# Patient Record
Sex: MALE | Race: WHITE | Employment: FULL TIME | URBAN - METROPOLITAN AREA
[De-identification: names, ages, dates, MRNs, and addresses within clinical notes are randomized per-mention and may not be internally consistent; named-entity substitution may affect disease eponyms.]

---

## 2018-02-12 PROBLEM — I25.810 CORONARY ARTERY DISEASE INVOLVING CORONARY BYPASS GRAFT OF NATIVE HEART WITHOUT ANGINA PECTORIS: Status: ACTIVE | Noted: 2018-02-12

## 2018-02-12 PROBLEM — L40.9 PSORIASIS: Status: ACTIVE | Noted: 2018-02-12

## 2018-02-12 PROBLEM — K21.9 GASTROESOPHAGEAL REFLUX DISEASE WITHOUT ESOPHAGITIS: Status: ACTIVE | Noted: 2018-02-12

## 2018-02-12 PROBLEM — E78.00 PURE HYPERCHOLESTEROLEMIA: Status: ACTIVE | Noted: 2018-02-12

## 2018-03-22 ENCOUNTER — TELEPHONE (OUTPATIENT)
Dept: PRIMARY CARE CLINIC | Age: 58
End: 2018-03-22

## 2018-03-22 DIAGNOSIS — M25.511 RIGHT SHOULDER PAIN, UNSPECIFIED CHRONICITY: Primary | ICD-10-CM

## 2018-03-22 NOTE — TELEPHONE ENCOUNTER
I think patient would benefit from physical therapy as well, maybe even more so than ortho referral. Can he do this? And, Is there a specific ortho they want.

## 2018-06-19 DIAGNOSIS — I10 HYPERTENSION, UNSPECIFIED TYPE: ICD-10-CM

## 2018-06-19 RX ORDER — LOSARTAN POTASSIUM 25 MG/1
25 TABLET ORAL DAILY
Qty: 30 TABLET | Refills: 2 | Status: SHIPPED | OUTPATIENT
Start: 2018-06-19 | End: 2018-10-15 | Stop reason: SDUPTHER

## 2018-10-15 ENCOUNTER — OFFICE VISIT (OUTPATIENT)
Dept: PRIMARY CARE CLINIC | Age: 58
End: 2018-10-15
Payer: COMMERCIAL

## 2018-10-15 VITALS
DIASTOLIC BLOOD PRESSURE: 88 MMHG | HEART RATE: 81 BPM | TEMPERATURE: 98.2 F | WEIGHT: 269 LBS | SYSTOLIC BLOOD PRESSURE: 130 MMHG | OXYGEN SATURATION: 98 % | BODY MASS INDEX: 37.52 KG/M2

## 2018-10-15 DIAGNOSIS — E78.00 PURE HYPERCHOLESTEROLEMIA: ICD-10-CM

## 2018-10-15 DIAGNOSIS — M76.32 IT BAND SYNDROME, LEFT: Primary | ICD-10-CM

## 2018-10-15 DIAGNOSIS — G89.29 CHRONIC PAIN OF RIGHT KNEE: ICD-10-CM

## 2018-10-15 DIAGNOSIS — I10 HYPERTENSION, UNSPECIFIED TYPE: ICD-10-CM

## 2018-10-15 DIAGNOSIS — M25.561 CHRONIC PAIN OF RIGHT KNEE: ICD-10-CM

## 2018-10-15 PROCEDURE — 99213 OFFICE O/P EST LOW 20 MIN: CPT | Performed by: PHYSICIAN ASSISTANT

## 2018-10-15 RX ORDER — LOSARTAN POTASSIUM 25 MG/1
25 TABLET ORAL DAILY
Qty: 30 TABLET | Refills: 5 | Status: SHIPPED | OUTPATIENT
Start: 2018-10-15 | End: 2018-10-15 | Stop reason: SDUPTHER

## 2018-10-15 RX ORDER — ATORVASTATIN CALCIUM 40 MG/1
40 TABLET, FILM COATED ORAL DAILY
Qty: 90 TABLET | Refills: 1 | Status: SHIPPED | OUTPATIENT
Start: 2018-10-15 | End: 2019-01-09 | Stop reason: ALTCHOICE

## 2018-10-15 RX ORDER — LOSARTAN POTASSIUM 25 MG/1
25 TABLET ORAL DAILY
Qty: 90 TABLET | Refills: 1 | Status: SHIPPED | OUTPATIENT
Start: 2018-10-15 | End: 2019-12-13 | Stop reason: SDUPTHER

## 2018-10-15 RX ORDER — ATORVASTATIN CALCIUM 40 MG/1
40 TABLET, FILM COATED ORAL DAILY
Qty: 30 TABLET | Refills: 5 | Status: SHIPPED | OUTPATIENT
Start: 2018-10-15 | End: 2018-10-15 | Stop reason: SDUPTHER

## 2018-10-15 NOTE — PROGRESS NOTES
Chief Complaint:   Knee Pain (right knee locking up and left thigh hurting and cramping)      History of Present Illness   Source of history provided by:  patient. History/Exam Limitations: none. Aliza Pereira is a 59 y.o. old male who has a past medical history of:   Past Medical History:   Diagnosis Date    CAD (coronary artery disease)     age 43     GERD (gastroesophageal reflux disease)     Hyperlipidemia     presents today with complaints of R knee pain, notes locking and catching on him on a mainly daily basis now and at times multiple times a day, the last 6-8 months. He has no known trauma or injury, but is very active with his job, up on [employer], and physical, he notes this has been very difficult. The pain has not improved since the onset of symptoms. Per pt, there is mild swelling and moderate pain with ROM. Pt denies any N/T, calf pain/edema, foot/ankle pain, hip pain, back pain, abrasions, fever, chills, or any other symptoms. Pt also notes L hip, lateral aspect has been bothersome the last 2-3 weeks, had total hip replacement with Dr. Carie Root a  Few years back, has had no issues with his hip until 3 weeks ago. notse cramplike like pain in lateral hip area, down IT band. Notes rest helps this, and massage. ROS    Unless otherwise stated in this report or unable to obtain because of the patient's clinical or mental status as evidenced by the medical record, this patients's positive and negative responses for Review of Systems, constitutional, psych, eyes, ENT, cardiovascular, respiratory, gastrointestinal, neurological, genitourinary, musculoskeletal, integument systems and systems related to the presenting problem are either stated in the preceding or were not pertinent or were negative for the symptoms and/or complaints related to the medical problem.don.     Past Medical History:   Past Surgical History:   Procedure Laterality Date    CORONARY ARTERY BYPASS GRAFT      quadruple bypass age 43    TOTAL HIP ARTHROPLASTY Left     mid 46s     Social History:  reports that he quit smoking about 4 years ago. He has a 30.00 pack-year smoking history. He has never used smokeless tobacco. He reports that he drinks alcohol. He reports that he does not use drugs. Family History: family history includes Alcohol Abuse in his father; Cancer in his father; No Known Problems in his mother. Allergies: Patient has no known allergies. Physical Exam         VS:  /88   Pulse 81   Temp 98.2 °F (36.8 °C)   Wt 269 lb (122 kg)   SpO2 98%   BMI 37.52 kg/m²     Oxygen Saturation Interpretation: Normal.    Constitutional:  Alert, development consistent with age. Neck:  Normal ROM. Supple. HEENT: Head NC/NT. Normal external ears. Eyes PERRL. Throat without erythema or exudate. Chest: Heart RRR without pathologic murmurs or gallops. Lungs CTA A and P without W/R/R.  R Knee: Inspection: R knee without obvious deformity. Tenderness:  minimal              Swelling/Effusion: none              Deformity: No obvious deformity. ROM: ROM 0º-120º with mild to moderate discomfort. Skin:  No bruising noted. No abrasions, rashes, or erythema noted. Drawer's:  neg anterior/posterior drawer sign. Rob's: Neg Rob's sign. Valgus/Varus Stress: neg Varus/Valgus stress sign. Crepitus: moderate-severe crepitus noted with flexion and extension. R  Hip:            Tenderness:  No TTP.              ROM: FROM hip without pain of R hip. L hip: +TTP along the mid area of IT band, tightness noted, full ROM of the L hip noted, no limitations, pain in IT band with adduction of leg. Joint(s) Below: thigh. Tenderness:  No TTP over calf, ankle, or foot without any edema. ROM: FROM without pain or deficits. Neurovascular:             Sensory deficit: Sensation intact above and below the injury site.

## 2018-11-16 ENCOUNTER — TELEPHONE (OUTPATIENT)
Dept: PRIMARY CARE CLINIC | Age: 58
End: 2018-11-16

## 2018-11-19 ENCOUNTER — OFFICE VISIT (OUTPATIENT)
Dept: PRIMARY CARE CLINIC | Age: 58
End: 2018-11-19
Payer: COMMERCIAL

## 2018-11-19 VITALS
DIASTOLIC BLOOD PRESSURE: 64 MMHG | SYSTOLIC BLOOD PRESSURE: 130 MMHG | OXYGEN SATURATION: 95 % | HEART RATE: 82 BPM | TEMPERATURE: 97.4 F

## 2018-11-19 DIAGNOSIS — G89.29 CHRONIC PAIN OF RIGHT KNEE: Primary | ICD-10-CM

## 2018-11-19 DIAGNOSIS — M25.561 CHRONIC PAIN OF RIGHT KNEE: Primary | ICD-10-CM

## 2018-11-19 PROCEDURE — 99213 OFFICE O/P EST LOW 20 MIN: CPT | Performed by: PHYSICIAN ASSISTANT

## 2018-11-19 NOTE — LETTER
[facility]  [address] [id number]  Phone: [phone]  Fax: [phone]         November 19, 2018     Patient: Zaida Monroe   YOB: 1960   Date of Visit: 11/19/2018       To Whom It May Concern:    Zaida Monroe was seen in my office. Due to his orthopedic medical condition, he is expected to be off his current job, on disability, for a minimum of one year (up to October 15, 2019). He is seeing orthopedic surgeon on November 29, 2018, for potential further treatment, and this date may change.        Sincerely,        Alejandra Hoyt PA-C        Signature:__________________________________

## 2019-01-07 ENCOUNTER — OFFICE VISIT (OUTPATIENT)
Dept: PRIMARY CARE CLINIC | Age: 59
End: 2019-01-07
Payer: COMMERCIAL

## 2019-01-07 ENCOUNTER — HOSPITAL ENCOUNTER (OUTPATIENT)
Age: 59
Discharge: HOME | End: 2019-01-09
Payer: COMMERCIAL

## 2019-01-07 VITALS
BODY MASS INDEX: 37.52 KG/M2 | SYSTOLIC BLOOD PRESSURE: 134 MMHG | HEART RATE: 67 BPM | OXYGEN SATURATION: 96 % | DIASTOLIC BLOOD PRESSURE: 84 MMHG | TEMPERATURE: 98.2 F | WEIGHT: 269 LBS

## 2019-01-07 DIAGNOSIS — Z01.818 PRE-OP EXAMINATION: ICD-10-CM

## 2019-01-07 DIAGNOSIS — I10 ESSENTIAL HYPERTENSION: ICD-10-CM

## 2019-01-07 DIAGNOSIS — E78.00 PURE HYPERCHOLESTEROLEMIA: Primary | ICD-10-CM

## 2019-01-07 DIAGNOSIS — E78.00 PURE HYPERCHOLESTEROLEMIA: ICD-10-CM

## 2019-01-07 DIAGNOSIS — L40.9 PSORIASIS: ICD-10-CM

## 2019-01-07 LAB
ALBUMIN SERPL-MCNC: 4.5 G/DL (ref 3.5–5.2)
ALP BLD-CCNC: 55 U/L (ref 40–129)
ALT SERPL-CCNC: 30 U/L (ref 0–40)
ANION GAP SERPL CALCULATED.3IONS-SCNC: 15 MMOL/L (ref 7–16)
AST SERPL-CCNC: 23 U/L (ref 0–39)
BASOPHILS ABSOLUTE: 0.06 E9/L (ref 0–0.2)
BASOPHILS RELATIVE PERCENT: 0.8 % (ref 0–2)
BILIRUB SERPL-MCNC: 0.5 MG/DL (ref 0–1.2)
BILIRUBIN, POC: NORMAL
BLOOD URINE, POC: NORMAL
BUN BLDV-MCNC: 12 MG/DL (ref 8–23)
CALCIUM SERPL-MCNC: 9.2 MG/DL (ref 8.6–10.2)
CHLORIDE BLD-SCNC: 101 MMOL/L (ref 98–107)
CHOLESTEROL, TOTAL: 207 MG/DL (ref 0–199)
CLARITY, POC: NORMAL
CO2: 25 MMOL/L (ref 22–29)
COLOR, POC: YELLOW
CREAT SERPL-MCNC: 0.8 MG/DL (ref 0.7–1.2)
EOSINOPHILS ABSOLUTE: 0.36 E9/L (ref 0.05–0.5)
EOSINOPHILS RELATIVE PERCENT: 5 % (ref 0–6)
GFR AFRICAN AMERICAN: >60
GFR NON-AFRICAN AMERICAN: >60 ML/MIN/1.73
GLUCOSE BLD-MCNC: 93 MG/DL (ref 74–99)
GLUCOSE URINE, POC: NORMAL
HCT VFR BLD CALC: 45.6 % (ref 37–54)
HDLC SERPL-MCNC: 46 MG/DL
HEMOGLOBIN: 14.7 G/DL (ref 12.5–16.5)
IMMATURE GRANULOCYTES #: 0.02 E9/L
IMMATURE GRANULOCYTES %: 0.3 % (ref 0–5)
KETONES, POC: NORMAL
LDL CHOLESTEROL CALCULATED: 136 MG/DL (ref 0–99)
LEUKOCYTE EST, POC: NORMAL
LYMPHOCYTES ABSOLUTE: 1.86 E9/L (ref 1.5–4)
LYMPHOCYTES RELATIVE PERCENT: 25.9 % (ref 20–42)
MCH RBC QN AUTO: 31.3 PG (ref 26–35)
MCHC RBC AUTO-ENTMCNC: 32.2 % (ref 32–34.5)
MCV RBC AUTO: 97 FL (ref 80–99.9)
MONOCYTES ABSOLUTE: 0.53 E9/L (ref 0.1–0.95)
MONOCYTES RELATIVE PERCENT: 7.4 % (ref 2–12)
NEUTROPHILS ABSOLUTE: 4.34 E9/L (ref 1.8–7.3)
NEUTROPHILS RELATIVE PERCENT: 60.6 % (ref 43–80)
NITRITE, POC: NORMAL
PDW BLD-RTO: 13.2 FL (ref 11.5–15)
PH, POC: 6.5
PLATELET # BLD: 319 E9/L (ref 130–450)
PMV BLD AUTO: 10 FL (ref 7–12)
POTASSIUM SERPL-SCNC: 4.3 MMOL/L (ref 3.5–5)
PROTEIN, POC: NORMAL
RBC # BLD: 4.7 E12/L (ref 3.8–5.8)
SODIUM BLD-SCNC: 141 MMOL/L (ref 132–146)
SPECIFIC GRAVITY, POC: 1.02
TOTAL PROTEIN: 7.4 G/DL (ref 6.4–8.3)
TRIGL SERPL-MCNC: 124 MG/DL (ref 0–149)
UROBILINOGEN, POC: NORMAL
VLDLC SERPL CALC-MCNC: 25 MG/DL
WBC # BLD: 7.2 E9/L (ref 4.5–11.5)

## 2019-01-07 PROCEDURE — 99214 OFFICE O/P EST MOD 30 MIN: CPT | Performed by: PHYSICIAN ASSISTANT

## 2019-01-07 PROCEDURE — 85025 COMPLETE CBC W/AUTO DIFF WBC: CPT

## 2019-01-07 PROCEDURE — 81002 URINALYSIS NONAUTO W/O SCOPE: CPT | Performed by: PHYSICIAN ASSISTANT

## 2019-01-07 PROCEDURE — 80053 COMPREHEN METABOLIC PANEL: CPT

## 2019-01-07 PROCEDURE — 80061 LIPID PANEL: CPT

## 2019-01-07 RX ORDER — BETAMETHASONE DIPROPIONATE 0.5 MG/G
CREAM TOPICAL 2 TIMES DAILY
Qty: 45 G | Refills: 5 | Status: SHIPPED
Start: 2019-01-07 | End: 2021-03-30 | Stop reason: SDUPTHER

## 2019-01-09 ENCOUNTER — TELEPHONE (OUTPATIENT)
Dept: PRIMARY CARE CLINIC | Age: 59
End: 2019-01-09

## 2019-01-09 RX ORDER — ROSUVASTATIN CALCIUM 10 MG/1
10 TABLET, COATED ORAL DAILY
Qty: 90 TABLET | Refills: 0 | Status: SHIPPED | OUTPATIENT
Start: 2019-01-09 | End: 2019-04-26 | Stop reason: SDUPTHER

## 2019-04-26 ENCOUNTER — OFFICE VISIT (OUTPATIENT)
Dept: PRIMARY CARE CLINIC | Age: 59
End: 2019-04-26
Payer: COMMERCIAL

## 2019-04-26 VITALS
DIASTOLIC BLOOD PRESSURE: 82 MMHG | OXYGEN SATURATION: 95 % | SYSTOLIC BLOOD PRESSURE: 130 MMHG | HEART RATE: 87 BPM | TEMPERATURE: 97.1 F | RESPIRATION RATE: 16 BRPM

## 2019-04-26 DIAGNOSIS — K21.9 GASTROESOPHAGEAL REFLUX DISEASE WITHOUT ESOPHAGITIS: ICD-10-CM

## 2019-04-26 DIAGNOSIS — I10 ESSENTIAL HYPERTENSION: ICD-10-CM

## 2019-04-26 DIAGNOSIS — I10 HYPERTENSION, UNSPECIFIED TYPE: ICD-10-CM

## 2019-04-26 DIAGNOSIS — E78.00 PURE HYPERCHOLESTEROLEMIA: Primary | ICD-10-CM

## 2019-04-26 DIAGNOSIS — S39.012S LUMBAR STRAIN, SEQUELA: ICD-10-CM

## 2019-04-26 DIAGNOSIS — I25.810 CORONARY ARTERY DISEASE INVOLVING CORONARY BYPASS GRAFT OF NATIVE HEART WITHOUT ANGINA PECTORIS: ICD-10-CM

## 2019-04-26 PROCEDURE — 99214 OFFICE O/P EST MOD 30 MIN: CPT | Performed by: PHYSICIAN ASSISTANT

## 2019-04-26 RX ORDER — CYCLOBENZAPRINE HCL 10 MG
10 TABLET ORAL 3 TIMES DAILY PRN
Qty: 90 TABLET | Refills: 1 | Status: SHIPPED
Start: 2019-04-26 | End: 2020-10-29 | Stop reason: SDUPTHER

## 2019-04-26 RX ORDER — OMEPRAZOLE 20 MG/1
20 CAPSULE, DELAYED RELEASE ORAL DAILY
Qty: 90 CAPSULE | Refills: 1 | Status: SHIPPED | OUTPATIENT
Start: 2019-04-26

## 2019-04-26 RX ORDER — ROSUVASTATIN CALCIUM 10 MG/1
10 TABLET, COATED ORAL DAILY
Qty: 90 TABLET | Refills: 1 | Status: SHIPPED | OUTPATIENT
Start: 2019-04-26 | End: 2019-12-13 | Stop reason: SDUPTHER

## 2019-04-26 ASSESSMENT — PATIENT HEALTH QUESTIONNAIRE - PHQ9
SUM OF ALL RESPONSES TO PHQ9 QUESTIONS 1 & 2: 0
1. LITTLE INTEREST OR PLEASURE IN DOING THINGS: 0
SUM OF ALL RESPONSES TO PHQ QUESTIONS 1-9: 0
2. FEELING DOWN, DEPRESSED OR HOPELESS: 0
SUM OF ALL RESPONSES TO PHQ QUESTIONS 1-9: 0

## 2019-04-26 NOTE — PROGRESS NOTES
Abhijeet Foss  1960 4/26/19      HPI:    Patient presents for FU since his total R knee replacement. He notes it has helped with him pain, he however had a click and pop in his knee, and is seeing his ortho, Dr. Keegan Sam next week. His knee has otherwise been doing OK. He does admit he strained his lower back last week, carried a crate (work at home) and felt a pull in his back, and felt a slight twinge of pain, next day it was tight and spasms, no pain down legs, no tinlging down legs, no weakness. Does admit he was given muscle relaxer last year, it did help some, but asks if something can help him rest.   Since last visit as well, we switched pt statin, as lipitor wasn't making his lipids optimal, he doesn't watch his diet. He will return for fasting labs. His HTn is controlled on low dose losartan. Past Medical History:   Diagnosis Date    CAD (coronary artery disease)     age 43     GERD (gastroesophageal reflux disease)     Hyperlipidemia         Past Surgical History:   Procedure Laterality Date    CORONARY ARTERY BYPASS GRAFT      quadruple bypass age 43    TOTAL HIP ARTHROPLASTY Left     mid 46s       Current Outpatient Medications   Medication Sig Dispense Refill    rosuvastatin (CRESTOR) 10 MG tablet Take 1 tablet by mouth daily 90 tablet 1    omeprazole (PRILOSEC) 20 MG delayed release capsule Take 1 capsule by mouth daily 90 capsule 1    cyclobenzaprine (FLEXERIL) 10 MG tablet Take 1 tablet by mouth 3 times daily as needed for Muscle spasms 90 tablet 1    betamethasone dipropionate (DIPROLENE) 0.05 % cream Apply topically 2 times daily Apply topically 2 times daily. 45 g 5    losartan (COZAAR) 25 MG tablet Take 1 tablet by mouth daily 90 tablet 1     No current facility-administered medications for this visit.         No Known Allergies    Family History   Problem Relation Age of Onset    No Known Problems Mother     Cancer Father         unknown cancer, liver, alcoholic    Alcohol Abuse Father        Social History     Socioeconomic History    Marital status:      Spouse name: Not on file    Number of children: Not on file    Years of education: Not on file    Highest education level: Not on file   Occupational History    Not on file   Social Needs    Financial resource strain: Not on file    Food insecurity:     Worry: Not on file     Inability: Not on file    Transportation needs:     Medical: Not on file     Non-medical: Not on file   Tobacco Use    Smoking status: Former Smoker     Packs/day: 1.00     Years: 30.00     Pack years: 30.00     Last attempt to quit: 10/15/2014     Years since quittin.5    Smokeless tobacco: Never Used   Substance and Sexual Activity    Alcohol use: Yes     Comment: occasional, on special occasions     Drug use: No    Sexual activity: Yes     Partners: Female     Comment: wife   Lifestyle    Physical activity:     Days per week: Not on file     Minutes per session: Not on file    Stress: Not on file   Relationships    Social connections:     Talks on phone: Not on file     Gets together: Not on file     Attends Tenriism service: Not on file     Active member of club or organization: Not on file     Attends meetings of clubs or organizations: Not on file     Relationship status: Not on file    Intimate partner violence:     Fear of current or ex partner: Not on file     Emotionally abused: Not on file     Physically abused: Not on file     Forced sexual activity: Not on file   Other Topics Concern    Not on file   Social History Narrative    Works in [employer], [employer] plasterer. . Feels safe at home. Review of Systems :    Constitutional: Negative for fatigue, malaise, fever, chills, appetite change and unexpected weight change. HENT: Negative for congestion, ear discharge, ear pain, facial swelling, mouth sores, postnasal drip, rhinorrhea, sinus pressure, sore throat, tinnitus and trouble swallowing. 95%     Constitutional:  A and O x 3, in NAD. Afebrile. Appears stated age. Eyes:  PERRLA, EOMI without nystagmus. Conjunctiva normal. Sclera anicteric. Neck:  Supple. Nontender, no lymphadenopathy noted, no thyromegaly. Lungs:  Clear to auscultation and breath sounds equal.  Heart:  Regular rate and rhythm, normal S1 and S2, no m/r/g. No murmurs with change in position or Valsalva. PMI non-displaced. UE and LE distal pulses intact. Abdomen:  Soft, NTND, NABS x 4, no masses or organomegaly, no rebound or guarding. MS: pain with lumbar flexion and twisting, otherwise normal, painless range of motion of the lumbar spine otherwise, +ttp and spasm in lower lumbar msucles, otherwise neg SLR. No neurovascular deficit. audible popping and clocking heard with knee ROM, no swelling, scar noted. 5/5 strength in UE's/LE's/ bilaterally. Skin:  No abrasions, ecchymoses, or lacerations unless noted elsewhere. Extremities  No cyanosis, clubbing, or edema noted. Neurological:   Oriented. Motor functions intact. Assessment/Plan:     Rahul Selby was seen today for 3 month follow-up and other. Diagnoses and all orders for this visit:    Pure hypercholesterolemia  -     rosuvastatin (CRESTOR) 10 MG tablet; Take 1 tablet by mouth daily  -     COMPREHENSIVE METABOLIC PANEL; Future  -     LIPID PANEL; Future    Gastroesophageal reflux disease without esophagitis  -     omeprazole (PRILOSEC) 20 MG delayed release capsule; Take 1 capsule by mouth daily    Essential hypertension    Coronary artery disease involving coronary bypass graft of native heart without angina pectoris  -     rosuvastatin (CRESTOR) 10 MG tablet; Take 1 tablet by mouth daily    Hypertension, unspecified type    Lumbar strain, sequela  -     cyclobenzaprine (FLEXERIL) 10 MG tablet; Take 1 tablet by mouth 3 times daily as needed for Muscle spasms    we will trial the flexeril 10mg, call if no improvements, FU is worsens.   FU with ortho as well as advised for the knee. He refuses colonoscopy, stool testing right now. We will check his lipids today, hopefully better, with CAD, we want his LDL more tightly controlled. Needs to watch diet as well. BP controlled. Return in about 6 months (around 10/26/2019). Counseled regarding above diagnosis, including possible risks and complications,  especially if left uncontrolled. Counseled regarding the possible side effects, risks, benefits and alternatives to treatment; patient and/or guardian verbalizes understanding, agrees, feels comfortable with and wishes to proceed with above treatment plan. Advised patient to call with any new medication issues, and read all Rx info from pharmacy to assure aware of all possible risks and side effects of medication before taking. Reviewed age and gender appropriate health screening exams and vaccinations. Advised patient regarding importance of keeping up with recommended health maintenance and to schedule as soon as possible if overdue, as this is important in assessing for undiagnosed pathology, especially cancer, as well as protecting against potentially harmful/life threatening disease. Patient and/or guardian verbalizes understanding and agrees with above counseling, assessment and plan. All questions answered.     Mitch Leon PA-C

## 2019-12-13 ENCOUNTER — HOSPITAL ENCOUNTER (OUTPATIENT)
Age: 59
Discharge: HOME | End: 2019-12-15
Payer: COMMERCIAL

## 2019-12-13 ENCOUNTER — OFFICE VISIT (OUTPATIENT)
Dept: PRIMARY CARE CLINIC | Age: 59
End: 2019-12-13
Payer: COMMERCIAL

## 2019-12-13 VITALS
TEMPERATURE: 98.2 F | WEIGHT: 269 LBS | SYSTOLIC BLOOD PRESSURE: 130 MMHG | HEIGHT: 71 IN | BODY MASS INDEX: 37.66 KG/M2 | RESPIRATION RATE: 16 BRPM | OXYGEN SATURATION: 95 % | HEART RATE: 81 BPM | DIASTOLIC BLOOD PRESSURE: 80 MMHG

## 2019-12-13 DIAGNOSIS — I10 HYPERTENSION, UNSPECIFIED TYPE: ICD-10-CM

## 2019-12-13 DIAGNOSIS — Z87.891 HISTORY OF SMOKING: ICD-10-CM

## 2019-12-13 DIAGNOSIS — Z12.5 SCREENING PSA (PROSTATE SPECIFIC ANTIGEN): ICD-10-CM

## 2019-12-13 DIAGNOSIS — I25.810 CORONARY ARTERY DISEASE INVOLVING CORONARY BYPASS GRAFT OF NATIVE HEART WITHOUT ANGINA PECTORIS: ICD-10-CM

## 2019-12-13 DIAGNOSIS — E78.00 PURE HYPERCHOLESTEROLEMIA: ICD-10-CM

## 2019-12-13 DIAGNOSIS — J30.2 SEASONAL ALLERGIES: ICD-10-CM

## 2019-12-13 DIAGNOSIS — R06.2 WHEEZING: ICD-10-CM

## 2019-12-13 DIAGNOSIS — R06.2 WHEEZING: Primary | ICD-10-CM

## 2019-12-13 LAB
ALBUMIN SERPL-MCNC: 4.4 G/DL (ref 3.5–5.2)
ALP BLD-CCNC: 58 U/L (ref 40–129)
ALT SERPL-CCNC: 21 U/L (ref 0–40)
ANION GAP SERPL CALCULATED.3IONS-SCNC: 14 MMOL/L (ref 7–16)
AST SERPL-CCNC: 21 U/L (ref 0–39)
BILIRUB SERPL-MCNC: 0.5 MG/DL (ref 0–1.2)
BUN BLDV-MCNC: 15 MG/DL (ref 8–23)
CALCIUM SERPL-MCNC: 9.4 MG/DL (ref 8.6–10.2)
CHLORIDE BLD-SCNC: 106 MMOL/L (ref 98–107)
CHOLESTEROL, TOTAL: 198 MG/DL (ref 0–199)
CO2: 21 MMOL/L (ref 22–29)
CREAT SERPL-MCNC: 0.8 MG/DL (ref 0.7–1.2)
GFR AFRICAN AMERICAN: >60
GFR NON-AFRICAN AMERICAN: >60 ML/MIN/1.73
GLUCOSE BLD-MCNC: 115 MG/DL (ref 74–99)
HCT VFR BLD CALC: 47.2 % (ref 37–54)
HDLC SERPL-MCNC: 39 MG/DL
HEMOGLOBIN: 14.8 G/DL (ref 12.5–16.5)
LDL CHOLESTEROL CALCULATED: 144 MG/DL (ref 0–99)
MCH RBC QN AUTO: 30.1 PG (ref 26–35)
MCHC RBC AUTO-ENTMCNC: 31.4 % (ref 32–34.5)
MCV RBC AUTO: 96.1 FL (ref 80–99.9)
PDW BLD-RTO: 13.2 FL (ref 11.5–15)
PLATELET # BLD: 319 E9/L (ref 130–450)
PMV BLD AUTO: 10.4 FL (ref 7–12)
POTASSIUM SERPL-SCNC: 4.8 MMOL/L (ref 3.5–5)
PROSTATE SPECIFIC ANTIGEN: 0.43 NG/ML (ref 0–4)
RBC # BLD: 4.91 E12/L (ref 3.8–5.8)
SODIUM BLD-SCNC: 141 MMOL/L (ref 132–146)
TOTAL PROTEIN: 7.6 G/DL (ref 6.4–8.3)
TRIGL SERPL-MCNC: 75 MG/DL (ref 0–149)
VLDLC SERPL CALC-MCNC: 15 MG/DL
WBC # BLD: 6 E9/L (ref 4.5–11.5)

## 2019-12-13 PROCEDURE — 99214 OFFICE O/P EST MOD 30 MIN: CPT | Performed by: PHYSICIAN ASSISTANT

## 2019-12-13 PROCEDURE — 80053 COMPREHEN METABOLIC PANEL: CPT

## 2019-12-13 PROCEDURE — G8417 CALC BMI ABV UP PARAM F/U: HCPCS | Performed by: PHYSICIAN ASSISTANT

## 2019-12-13 PROCEDURE — G8599 NO ASA/ANTIPLAT THER USE RNG: HCPCS | Performed by: PHYSICIAN ASSISTANT

## 2019-12-13 PROCEDURE — 85027 COMPLETE CBC AUTOMATED: CPT

## 2019-12-13 PROCEDURE — G8484 FLU IMMUNIZE NO ADMIN: HCPCS | Performed by: PHYSICIAN ASSISTANT

## 2019-12-13 PROCEDURE — 80061 LIPID PANEL: CPT

## 2019-12-13 PROCEDURE — 3017F COLORECTAL CA SCREEN DOC REV: CPT | Performed by: PHYSICIAN ASSISTANT

## 2019-12-13 PROCEDURE — G0103 PSA SCREENING: HCPCS

## 2019-12-13 PROCEDURE — 1036F TOBACCO NON-USER: CPT | Performed by: PHYSICIAN ASSISTANT

## 2019-12-13 PROCEDURE — G8427 DOCREV CUR MEDS BY ELIG CLIN: HCPCS | Performed by: PHYSICIAN ASSISTANT

## 2019-12-13 RX ORDER — ALBUTEROL SULFATE 90 UG/1
2 AEROSOL, METERED RESPIRATORY (INHALATION) 4 TIMES DAILY PRN
Qty: 1 INHALER | Refills: 2 | Status: SHIPPED | OUTPATIENT
Start: 2019-12-13

## 2019-12-13 RX ORDER — MONTELUKAST SODIUM 10 MG/1
10 TABLET ORAL DAILY
Qty: 30 TABLET | Refills: 2 | Status: SHIPPED
Start: 2019-12-13 | End: 2020-04-14 | Stop reason: SDUPTHER

## 2019-12-13 RX ORDER — ROSUVASTATIN CALCIUM 10 MG/1
10 TABLET, COATED ORAL DAILY
Qty: 90 TABLET | Refills: 1 | Status: SHIPPED | OUTPATIENT
Start: 2019-12-13 | End: 2020-06-01 | Stop reason: SDUPTHER

## 2019-12-13 RX ORDER — LOSARTAN POTASSIUM 25 MG/1
25 TABLET ORAL DAILY
Qty: 90 TABLET | Refills: 1 | Status: SHIPPED | OUTPATIENT
Start: 2019-12-13 | End: 2020-06-01 | Stop reason: SDUPTHER

## 2020-04-14 RX ORDER — MONTELUKAST SODIUM 10 MG/1
10 TABLET ORAL DAILY
Qty: 90 TABLET | Refills: 1 | Status: SHIPPED
Start: 2020-04-14 | End: 2020-10-13 | Stop reason: SDUPTHER

## 2020-06-01 RX ORDER — LOSARTAN POTASSIUM 25 MG/1
25 TABLET ORAL DAILY
Qty: 90 TABLET | Refills: 1 | Status: SHIPPED
Start: 2020-06-01 | End: 2020-06-02 | Stop reason: SDUPTHER

## 2020-06-01 RX ORDER — ROSUVASTATIN CALCIUM 10 MG/1
10 TABLET, COATED ORAL DAILY
Qty: 90 TABLET | Refills: 1 | Status: SHIPPED
Start: 2020-06-01 | End: 2020-06-02 | Stop reason: SDUPTHER

## 2020-06-02 RX ORDER — LOSARTAN POTASSIUM 25 MG/1
25 TABLET ORAL DAILY
Qty: 90 TABLET | Refills: 1 | OUTPATIENT
Start: 2020-06-02

## 2020-06-02 RX ORDER — ROSUVASTATIN CALCIUM 10 MG/1
10 TABLET, COATED ORAL DAILY
Qty: 90 TABLET | Refills: 1 | OUTPATIENT
Start: 2020-06-02

## 2020-06-02 RX ORDER — LOSARTAN POTASSIUM 25 MG/1
25 TABLET ORAL DAILY
Qty: 90 TABLET | Refills: 1 | Status: SHIPPED
Start: 2020-06-02 | End: 2021-01-08 | Stop reason: SDUPTHER

## 2020-06-02 RX ORDER — ROSUVASTATIN CALCIUM 10 MG/1
10 TABLET, COATED ORAL DAILY
Qty: 90 TABLET | Refills: 1 | Status: SHIPPED
Start: 2020-06-02 | End: 2021-01-08 | Stop reason: SDUPTHER

## 2020-10-13 ENCOUNTER — HOSPITAL ENCOUNTER (OUTPATIENT)
Age: 60
Discharge: HOME | End: 2020-10-15
Payer: COMMERCIAL

## 2020-10-13 ENCOUNTER — OFFICE VISIT (OUTPATIENT)
Dept: PRIMARY CARE CLINIC | Age: 60
End: 2020-10-13
Payer: COMMERCIAL

## 2020-10-13 VITALS
SYSTOLIC BLOOD PRESSURE: 130 MMHG | TEMPERATURE: 97.7 F | DIASTOLIC BLOOD PRESSURE: 84 MMHG | OXYGEN SATURATION: 95 % | HEART RATE: 85 BPM | RESPIRATION RATE: 18 BRPM

## 2020-10-13 PROBLEM — J45.909 EXTRINSIC ASTHMA WITHOUT COMPLICATION: Status: ACTIVE | Noted: 2020-10-13

## 2020-10-13 LAB
ALBUMIN SERPL-MCNC: 4.3 G/DL (ref 3.5–5.2)
ALP BLD-CCNC: 57 U/L (ref 40–129)
ALT SERPL-CCNC: 26 U/L (ref 0–40)
ANION GAP SERPL CALCULATED.3IONS-SCNC: 16 MMOL/L (ref 7–16)
AST SERPL-CCNC: 27 U/L (ref 0–39)
BILIRUB SERPL-MCNC: 0.5 MG/DL (ref 0–1.2)
BUN BLDV-MCNC: 15 MG/DL (ref 8–23)
CALCIUM SERPL-MCNC: 9.9 MG/DL (ref 8.6–10.2)
CHLORIDE BLD-SCNC: 104 MMOL/L (ref 98–107)
CHOLESTEROL, TOTAL: 193 MG/DL (ref 0–199)
CO2: 21 MMOL/L (ref 22–29)
CREAT SERPL-MCNC: 0.8 MG/DL (ref 0.7–1.2)
GFR AFRICAN AMERICAN: >60
GFR NON-AFRICAN AMERICAN: >60 ML/MIN/1.73
GLUCOSE BLD-MCNC: 122 MG/DL (ref 74–99)
HCT VFR BLD CALC: 49.6 % (ref 37–54)
HDLC SERPL-MCNC: 47 MG/DL
HEMOGLOBIN: 15.5 G/DL (ref 12.5–16.5)
LDL CHOLESTEROL CALCULATED: 116 MG/DL (ref 0–99)
MCH RBC QN AUTO: 30.7 PG (ref 26–35)
MCHC RBC AUTO-ENTMCNC: 31.3 % (ref 32–34.5)
MCV RBC AUTO: 98.2 FL (ref 80–99.9)
PDW BLD-RTO: 13.7 FL (ref 11.5–15)
PLATELET # BLD: 347 E9/L (ref 130–450)
PMV BLD AUTO: 10.2 FL (ref 7–12)
POTASSIUM SERPL-SCNC: 4.8 MMOL/L (ref 3.5–5)
RBC # BLD: 5.05 E12/L (ref 3.8–5.8)
SODIUM BLD-SCNC: 141 MMOL/L (ref 132–146)
TOTAL PROTEIN: 7.5 G/DL (ref 6.4–8.3)
TRIGL SERPL-MCNC: 148 MG/DL (ref 0–149)
VLDLC SERPL CALC-MCNC: 30 MG/DL
WBC # BLD: 6.8 E9/L (ref 4.5–11.5)

## 2020-10-13 PROCEDURE — 90686 IIV4 VACC NO PRSV 0.5 ML IM: CPT | Performed by: PHYSICIAN ASSISTANT

## 2020-10-13 PROCEDURE — 85027 COMPLETE CBC AUTOMATED: CPT

## 2020-10-13 PROCEDURE — 80053 COMPREHEN METABOLIC PANEL: CPT

## 2020-10-13 PROCEDURE — 3017F COLORECTAL CA SCREEN DOC REV: CPT | Performed by: PHYSICIAN ASSISTANT

## 2020-10-13 PROCEDURE — G8417 CALC BMI ABV UP PARAM F/U: HCPCS | Performed by: PHYSICIAN ASSISTANT

## 2020-10-13 PROCEDURE — G8482 FLU IMMUNIZE ORDER/ADMIN: HCPCS | Performed by: PHYSICIAN ASSISTANT

## 2020-10-13 PROCEDURE — 90471 IMMUNIZATION ADMIN: CPT | Performed by: PHYSICIAN ASSISTANT

## 2020-10-13 PROCEDURE — 80061 LIPID PANEL: CPT

## 2020-10-13 PROCEDURE — 1036F TOBACCO NON-USER: CPT | Performed by: PHYSICIAN ASSISTANT

## 2020-10-13 PROCEDURE — 93000 ELECTROCARDIOGRAM COMPLETE: CPT | Performed by: PHYSICIAN ASSISTANT

## 2020-10-13 PROCEDURE — 99214 OFFICE O/P EST MOD 30 MIN: CPT | Performed by: PHYSICIAN ASSISTANT

## 2020-10-13 PROCEDURE — G8427 DOCREV CUR MEDS BY ELIG CLIN: HCPCS | Performed by: PHYSICIAN ASSISTANT

## 2020-10-13 RX ORDER — MONTELUKAST SODIUM 10 MG/1
10 TABLET ORAL DAILY
Qty: 90 TABLET | Refills: 1 | Status: SHIPPED
Start: 2020-10-13 | End: 2021-01-08 | Stop reason: SDUPTHER

## 2020-10-13 ASSESSMENT — PATIENT HEALTH QUESTIONNAIRE - PHQ9
2. FEELING DOWN, DEPRESSED OR HOPELESS: 0
1. LITTLE INTEREST OR PLEASURE IN DOING THINGS: 0
SUM OF ALL RESPONSES TO PHQ QUESTIONS 1-9: 0
SUM OF ALL RESPONSES TO PHQ9 QUESTIONS 1 & 2: 0
SUM OF ALL RESPONSES TO PHQ QUESTIONS 1-9: 0

## 2020-10-13 NOTE — PROGRESS NOTES
Bernard Murilloer  1960  10/13/20      HPI:    Patient presents for his routine check. He is seeing Dr. Josie wSann next week for another opinion on his knee. Notes no pain just a lot of swelling if he does too much, states not bad today. HTN, is controlled on losartan 25mg, and lipids last checked 12/2019, stable on crestor. He states he is compliant with his meds, \"I forget one day then go right back on\". Denies any cardiopulm S/Sx, CAD has been stable. ECG done today, no changes from 2018. GERD, controlled, takes PPi on occasion, states not QD, and less than few times a week. No GI issues, refuses ANUSHKA, refuses colonoscopy or stool testing (cologuard, fit) he will think about  it. Notse his allergies controlled on singualir QD, andhasn't needed albuterol, no wheezes, states only wheezes if he were to smoke (doesn't anymore, but would on rare occasion). Past Medical History:   Diagnosis Date    CAD (coronary artery disease)     age 43     GERD (gastroesophageal reflux disease)     Hyperlipidemia         Past Surgical History:   Procedure Laterality Date    CORONARY ARTERY BYPASS GRAFT      quadruple bypass age 43    JOINT REPLACEMENT      TOTAL HIP ARTHROPLASTY Left     mid 46s       Current Outpatient Medications   Medication Sig Dispense Refill    montelukast (SINGULAIR) 10 MG tablet Take 1 tablet by mouth daily 90 tablet 1    losartan (COZAAR) 25 MG tablet Take 1 tablet by mouth daily 90 tablet 1    rosuvastatin (CRESTOR) 10 MG tablet Take 1 tablet by mouth daily 90 tablet 1    albuterol sulfate  (90 Base) MCG/ACT inhaler Inhale 2 puffs into the lungs 4 times daily as needed for Wheezing 1 Inhaler 2    omeprazole (PRILOSEC) 20 MG delayed release capsule Take 1 capsule by mouth daily 90 capsule 1    betamethasone dipropionate (DIPROLENE) 0.05 % cream Apply topically 2 times daily Apply topically 2 times daily.  45 g 5     No current facility-administered medications for this visit. No Known Allergies    Family History   Problem Relation Age of Onset    No Known Problems Mother     Cancer Father         unknown cancer, liver, alcoholic    Alcohol Abuse Father        Social History     Socioeconomic History    Marital status:      Spouse name: Not on file    Number of children: Not on file    Years of education: Not on file    Highest education level: Not on file   Occupational History    Not on file   Social Needs    Financial resource strain: Not on file    Food insecurity     Worry: Not on file     Inability: Not on file    Transportation needs     Medical: Not on file     Non-medical: Not on file   Tobacco Use    Smoking status: Former Smoker     Packs/day: 1.00     Years: 30.00     Pack years: 30.00     Last attempt to quit: 10/15/2014     Years since quittin.0    Smokeless tobacco: Never Used   Substance and Sexual Activity    Alcohol use: Yes     Comment: occasional, on special occasions     Drug use: No    Sexual activity: Yes     Partners: Female     Comment: wife   Lifestyle    Physical activity     Days per week: Not on file     Minutes per session: Not on file    Stress: Not on file   Relationships    Social connections     Talks on phone: Not on file     Gets together: Not on file     Attends Temple service: Not on file     Active member of club or organization: Not on file     Attends meetings of clubs or organizations: Not on file     Relationship status: Not on file    Intimate partner violence     Fear of current or ex partner: Not on file     Emotionally abused: Not on file     Physically abused: Not on file     Forced sexual activity: Not on file   Other Topics Concern    Not on file   Social History Narrative    Works in construction(now retired), union plasterer. . Review of Systems :     Constitutional: Negative for fatigue, malaise, fever, chills, appetite change and unexpected weight change.    HENT: Negative for congestion, ear discharge, ear pain, facial swelling, mouth sores, postnasal drip, rhinorrhea, sinus pressure, sore throat, tinnitus and trouble swallowing. Eyes: Negative for vision changes, double vision, pain  Respiratory: Negative for cough, chest tightness, shortness of breath, chest heaviness, pleuritic pain,  wheezing. Cardiovascular: Negative for diaphoresis, chest pain, palpitations, or edema   Gastrointestinal: Negative for nausea, vomiting, abdominal pain, diarrhea, constipation, blood in stool, melena, changes in bowel habits. Endocrine: Negative for polydipsia, polyphagia and polyuria. No heat or cold intolerance. Genitourinary: Negative for dysuria, urgency, frequency, hematuria, difficulty urinating, nocturia. Musculoskeletal: Negative for myalgias, back pain, and arthralgias. No gait disturbance. Skin: Negative for rash, lesions, hair or nail changes. Allergic/Immunologic: Negative for environmental allergies and food allergies. Neurological: Negative for dizziness, weakness, tremors, syncope, speech difficulty, light-headedness, bowel or bladder control changes, numbness and headaches. Hematological: Negative for adenopathy. Does not bruise/bleed easily. Psychiatric/Behavioral: Negative for suicidal ideas, behavioral problems, sleep disturbance and decreased concentration. The patient is not nervous/anxious. Unless otherwise stated in this report or unable to obtain because of the patient's clinical or mental status as evidenced by the medical record, this patients's positive and negative responses for Review of Systems, constitutional, psych, eyes, ENT, cardiovascular, respiratory, gastrointestinal, neurological, genitourinary, musculoskeletal, integument systems and systems related to the presenting problem are either stated in the preceding or were not pertinent or were negative for the symptoms and/or complaints related to the medical problem.         Physical Exam: tablet by mouth daily  Seasonal allergies  -     montelukast (SINGULAIR) 10 MG tablet; Take 1 tablet by mouth daily    He refuses health maintenance (ANUSHKA, stool testing, colonoscopy)- he will think about. Return in about 6 months (around 4/13/2021). Counseled regarding above diagnosis, including possible risks and complications,  especially if left uncontrolled. Counseled regarding the possible side effects, risks, benefits and alternatives to treatment; patient and/or guardian verbalizes understanding, agrees, feels comfortable with and wishes to proceed with above treatment plan. Advised patient to call with any new medication issues, and read all Rx info from pharmacy to assure aware of all possible risks and side effects of medication before taking. Reviewed age and gender appropriate health screening exams and vaccinations. Advised patient regarding importance of keeping up with recommended health maintenance and to schedule as soon as possible if overdue, as this is important in assessing for undiagnosed pathology, especially cancer, as well as protecting against potentially harmful/life threatening disease. Patient and/or guardian verbalizes understanding and agrees with above counseling, assessment and plan. All questions answered.     Ryan Dean PA-C

## 2020-10-29 ENCOUNTER — TELEPHONE (OUTPATIENT)
Dept: PRIMARY CARE CLINIC | Age: 60
End: 2020-10-29

## 2020-10-29 RX ORDER — METHYLPREDNISOLONE 4 MG/1
TABLET ORAL
Qty: 1 KIT | Refills: 0 | Status: SHIPPED | OUTPATIENT
Start: 2020-10-29 | End: 2020-11-04

## 2020-10-29 RX ORDER — CYCLOBENZAPRINE HCL 10 MG
10 TABLET ORAL 3 TIMES DAILY PRN
Qty: 90 TABLET | Refills: 1 | Status: SHIPPED
Start: 2020-10-29 | End: 2021-01-11

## 2020-10-29 NOTE — TELEPHONE ENCOUNTER
Wife called to report that patient pulled his back about 4 days ago. He has been taking Advil and Aleve and applying heat. She was asking if you would refill the flexeril that he previously had and send in a stronger anti-inflammatory. Patient uses [facility]. Flexeril rx pended below.

## 2021-01-08 DIAGNOSIS — R06.2 WHEEZING: ICD-10-CM

## 2021-01-08 DIAGNOSIS — E78.00 PURE HYPERCHOLESTEROLEMIA: ICD-10-CM

## 2021-01-08 DIAGNOSIS — I25.810 CORONARY ARTERY DISEASE INVOLVING CORONARY BYPASS GRAFT OF NATIVE HEART WITHOUT ANGINA PECTORIS: ICD-10-CM

## 2021-01-08 DIAGNOSIS — I10 HYPERTENSION, UNSPECIFIED TYPE: ICD-10-CM

## 2021-01-08 DIAGNOSIS — J30.2 SEASONAL ALLERGIES: ICD-10-CM

## 2021-01-08 RX ORDER — MONTELUKAST SODIUM 10 MG/1
10 TABLET ORAL DAILY
Qty: 90 TABLET | Refills: 1 | Status: SHIPPED
Start: 2021-01-08 | End: 2021-05-13

## 2021-01-08 RX ORDER — ROSUVASTATIN CALCIUM 10 MG/1
10 TABLET, COATED ORAL DAILY
Qty: 90 TABLET | Refills: 1 | Status: SHIPPED
Start: 2021-01-08 | End: 2022-05-31 | Stop reason: SDUPTHER

## 2021-01-08 RX ORDER — LOSARTAN POTASSIUM 25 MG/1
25 TABLET ORAL DAILY
Qty: 90 TABLET | Refills: 1 | Status: SHIPPED
Start: 2021-01-08 | End: 2022-05-31 | Stop reason: SDUPTHER

## 2021-01-11 DIAGNOSIS — S39.012S LUMBAR STRAIN, SEQUELA: ICD-10-CM

## 2021-01-11 RX ORDER — CYCLOBENZAPRINE HCL 10 MG
TABLET ORAL
Qty: 90 TABLET | Refills: 1 | Status: SHIPPED
Start: 2021-01-11 | End: 2022-05-31 | Stop reason: SDUPTHER

## 2021-03-30 DIAGNOSIS — L40.9 PSORIASIS: ICD-10-CM

## 2021-03-30 RX ORDER — BETAMETHASONE DIPROPIONATE 0.5 MG/G
CREAM TOPICAL 2 TIMES DAILY
Qty: 45 G | Refills: 5 | Status: SHIPPED
Start: 2021-03-30 | End: 2022-04-22

## 2021-05-12 DIAGNOSIS — R06.2 WHEEZING: ICD-10-CM

## 2021-05-12 DIAGNOSIS — J30.2 SEASONAL ALLERGIES: ICD-10-CM

## 2021-05-13 RX ORDER — MONTELUKAST SODIUM 10 MG/1
TABLET ORAL
Qty: 90 TABLET | Refills: 1 | Status: SHIPPED
Start: 2021-05-13 | End: 2022-01-05

## 2021-08-02 ENCOUNTER — TELEPHONE (OUTPATIENT)
Dept: PRIMARY CARE CLINIC | Age: 61
End: 2021-08-02

## 2021-08-02 NOTE — TELEPHONE ENCOUNTER
----- Message from [facility] sent at 8/2/2021  2:24 PM EDT -----  Subject: Appointment Request    Reason for Call: Urgent Cough Cold    QUESTIONS  Type of Appointment? Established Patient  Reason for appointment request? No appointments available during search  Additional Information for Provider? Pt has had wet cough and slight   congestion for over 6 weeks now.  ---------------------------------------------------------------------------  --------------  CALL BACK INFO  What is the best way for the office to contact you? OK to leave message on   voicemail  Preferred Call Back Phone Number? [phone]  ---------------------------------------------------------------------------  --------------  SCRIPT ANSWERS  Relationship to Patient? Other  Representative Name? Rhenea  Additional information verified (besides Name and Date of Birth)? Address  Are you currently unable to finish sentences due to any difficulty   breathing? No  Are you unable to swallow liquids? No  Are you having fevers (100.4 or greater), chills, or sweats? No  Do you have COPD, asthma or a chronic lung condition? No  Have your symptoms been present for more than 5 days? Yes   Have you been diagnosed with, awaiting test results for, or told that you   are suspected of having COVID-19 (Coronavirus)? (If patient has tested   negative or was tested as a requirement for work, school, or travel and   not based on symptoms, answer no)? No  Do you currently have flu-like symptoms including fever or chills, cough,   shortness of breath, difficulty breathing, or new loss of taste or smell? No  Have you had close contact with someone with COVID-19 in the last 14 days? No  (Service Expert  click yes below to proceed with [facility]ual   Scheduling)?  Yes

## 2022-01-05 DIAGNOSIS — R06.2 WHEEZING: ICD-10-CM

## 2022-01-05 DIAGNOSIS — J30.2 SEASONAL ALLERGIES: ICD-10-CM

## 2022-01-05 RX ORDER — MONTELUKAST SODIUM 10 MG/1
TABLET ORAL
Qty: 90 TABLET | Refills: 1 | Status: SHIPPED
Start: 2022-01-05 | End: 2022-05-31 | Stop reason: SDUPTHER

## 2022-04-19 DIAGNOSIS — L40.9 PSORIASIS: ICD-10-CM

## 2022-04-22 RX ORDER — BETAMETHASONE DIPROPIONATE 0.5 MG/G
CREAM TOPICAL
Qty: 45 G | Refills: 5 | Status: SHIPPED
Start: 2022-04-22 | End: 2022-05-31 | Stop reason: SDUPTHER

## 2022-05-31 ENCOUNTER — OFFICE VISIT (OUTPATIENT)
Dept: PRIMARY CARE CLINIC | Age: 62
End: 2022-05-31
Payer: COMMERCIAL

## 2022-05-31 VITALS
HEIGHT: 71 IN | SYSTOLIC BLOOD PRESSURE: 136 MMHG | TEMPERATURE: 97.2 F | HEART RATE: 75 BPM | DIASTOLIC BLOOD PRESSURE: 80 MMHG | BODY MASS INDEX: 37.66 KG/M2 | OXYGEN SATURATION: 99 % | WEIGHT: 269 LBS

## 2022-05-31 DIAGNOSIS — Z12.5 SCREENING PSA (PROSTATE SPECIFIC ANTIGEN): ICD-10-CM

## 2022-05-31 DIAGNOSIS — R06.2 WHEEZING: ICD-10-CM

## 2022-05-31 DIAGNOSIS — S39.012S LUMBAR STRAIN, SEQUELA: ICD-10-CM

## 2022-05-31 DIAGNOSIS — I25.810 CORONARY ARTERY DISEASE INVOLVING CORONARY BYPASS GRAFT OF NATIVE HEART WITHOUT ANGINA PECTORIS: ICD-10-CM

## 2022-05-31 DIAGNOSIS — E78.00 PURE HYPERCHOLESTEROLEMIA: ICD-10-CM

## 2022-05-31 DIAGNOSIS — J30.2 SEASONAL ALLERGIES: ICD-10-CM

## 2022-05-31 DIAGNOSIS — Z12.11 SCREENING FOR COLON CANCER: ICD-10-CM

## 2022-05-31 DIAGNOSIS — Z00.00 ENCOUNTER FOR WELL ADULT EXAM WITHOUT ABNORMAL FINDINGS: Primary | ICD-10-CM

## 2022-05-31 DIAGNOSIS — L40.9 PSORIASIS: ICD-10-CM

## 2022-05-31 DIAGNOSIS — I10 HYPERTENSION, UNSPECIFIED TYPE: ICD-10-CM

## 2022-05-31 LAB
ALBUMIN SERPL-MCNC: 4.3 G/DL (ref 3.5–5.2)
ALP BLD-CCNC: 63 U/L (ref 40–129)
ALT SERPL-CCNC: 23 U/L (ref 0–40)
ANION GAP SERPL CALCULATED.3IONS-SCNC: 15 MMOL/L (ref 7–16)
AST SERPL-CCNC: 32 U/L (ref 0–39)
BILIRUB SERPL-MCNC: 0.3 MG/DL (ref 0–1.2)
BUN BLDV-MCNC: 18 MG/DL (ref 6–23)
CALCIUM SERPL-MCNC: 9.8 MG/DL (ref 8.6–10.2)
CHLORIDE BLD-SCNC: 105 MMOL/L (ref 98–107)
CHOLESTEROL, TOTAL: 190 MG/DL (ref 0–199)
CO2: 24 MMOL/L (ref 22–29)
CREAT SERPL-MCNC: 0.8 MG/DL (ref 0.7–1.2)
GFR AFRICAN AMERICAN: >60
GFR NON-AFRICAN AMERICAN: >60 ML/MIN/1.73
GLUCOSE BLD-MCNC: 115 MG/DL (ref 74–99)
HCT VFR BLD CALC: 44.2 % (ref 37–54)
HDLC SERPL-MCNC: 44 MG/DL
HEMOGLOBIN: 14.1 G/DL (ref 12.5–16.5)
LDL CHOLESTEROL CALCULATED: 123 MG/DL (ref 0–99)
MCH RBC QN AUTO: 30.5 PG (ref 26–35)
MCHC RBC AUTO-ENTMCNC: 31.9 % (ref 32–34.5)
MCV RBC AUTO: 95.5 FL (ref 80–99.9)
PDW BLD-RTO: 13.9 FL (ref 11.5–15)
PLATELET # BLD: 333 E9/L (ref 130–450)
PMV BLD AUTO: 10.2 FL (ref 7–12)
POTASSIUM SERPL-SCNC: 5.2 MMOL/L (ref 3.5–5)
PROSTATE SPECIFIC ANTIGEN: 0.23 NG/ML (ref 0–4)
RBC # BLD: 4.63 E12/L (ref 3.8–5.8)
SODIUM BLD-SCNC: 144 MMOL/L (ref 132–146)
TOTAL PROTEIN: 7.3 G/DL (ref 6.4–8.3)
TRIGL SERPL-MCNC: 113 MG/DL (ref 0–149)
VLDLC SERPL CALC-MCNC: 23 MG/DL
WBC # BLD: 7.7 E9/L (ref 4.5–11.5)

## 2022-05-31 PROCEDURE — 99213 OFFICE O/P EST LOW 20 MIN: CPT | Performed by: PHYSICIAN ASSISTANT

## 2022-05-31 PROCEDURE — 93000 ELECTROCARDIOGRAM COMPLETE: CPT | Performed by: PHYSICIAN ASSISTANT

## 2022-05-31 PROCEDURE — 99396 PREV VISIT EST AGE 40-64: CPT | Performed by: PHYSICIAN ASSISTANT

## 2022-05-31 RX ORDER — MONTELUKAST SODIUM 10 MG/1
TABLET ORAL
Qty: 90 TABLET | Refills: 1 | Status: SHIPPED | OUTPATIENT
Start: 2022-05-31

## 2022-05-31 RX ORDER — CYCLOBENZAPRINE HCL 10 MG
TABLET ORAL
Qty: 90 TABLET | Refills: 1 | Status: SHIPPED | OUTPATIENT
Start: 2022-05-31

## 2022-05-31 RX ORDER — BETAMETHASONE DIPROPIONATE 0.5 MG/G
CREAM TOPICAL
Qty: 45 G | Refills: 5 | Status: SHIPPED | OUTPATIENT
Start: 2022-05-31

## 2022-05-31 RX ORDER — ROSUVASTATIN CALCIUM 10 MG/1
10 TABLET, COATED ORAL DAILY
Qty: 90 TABLET | Refills: 2 | Status: SHIPPED | OUTPATIENT
Start: 2022-05-31

## 2022-05-31 RX ORDER — LOSARTAN POTASSIUM 25 MG/1
25 TABLET ORAL DAILY
Qty: 90 TABLET | Refills: 1 | Status: SHIPPED | OUTPATIENT
Start: 2022-05-31

## 2022-05-31 SDOH — ECONOMIC STABILITY: FOOD INSECURITY: WITHIN THE PAST 12 MONTHS, YOU WORRIED THAT YOUR FOOD WOULD RUN OUT BEFORE YOU GOT MONEY TO BUY MORE.: NEVER TRUE

## 2022-05-31 SDOH — ECONOMIC STABILITY: FOOD INSECURITY: WITHIN THE PAST 12 MONTHS, THE FOOD YOU BOUGHT JUST DIDN'T LAST AND YOU DIDN'T HAVE MONEY TO GET MORE.: NEVER TRUE

## 2022-05-31 ASSESSMENT — SOCIAL DETERMINANTS OF HEALTH (SDOH): HOW HARD IS IT FOR YOU TO PAY FOR THE VERY BASICS LIKE FOOD, HOUSING, MEDICAL CARE, AND HEATING?: NOT HARD AT ALL

## 2022-05-31 ASSESSMENT — PATIENT HEALTH QUESTIONNAIRE - PHQ9
SUM OF ALL RESPONSES TO PHQ QUESTIONS 1-9: 0
1. LITTLE INTEREST OR PLEASURE IN DOING THINGS: 0
SUM OF ALL RESPONSES TO PHQ QUESTIONS 1-9: 0
SUM OF ALL RESPONSES TO PHQ QUESTIONS 1-9: 0
2. FEELING DOWN, DEPRESSED OR HOPELESS: 0
SUM OF ALL RESPONSES TO PHQ QUESTIONS 1-9: 0
SUM OF ALL RESPONSES TO PHQ9 QUESTIONS 1 & 2: 0

## 2022-05-31 NOTE — PROGRESS NOTES
Well Adult Note  Name: Blake Come Date: 2022   MRN: [id number] Sex: Male   Age: 59 y.o. Ethnicity: Non- / Non    : 1960 Race: White (non-)      Tiffanie Mario is here for well adult exam.  History:      Pt presents for yearly physical. He has no real complaints today. Admits hasn't been real compliant with taking this medications. Is on crestor 10mg, and losartan for lipids and HTN. He has a Hx of CAD, but he refuses to see any cardiologist for follow ups and maintenance, agrees to an ECG today. Denies any cardiopulm S/Sx. Does take occ flexerfil for his back. singulair for allergies, and occ albuterol if wheezes. Denies cough. No SOB. Refuses colonoscopy, agrees to cologuard. No urinary Sx or bowel issues. Review of Systems   Constitutional: Negative. HENT: Negative. Respiratory: Negative. Cardiovascular: Negative. Gastrointestinal: Negative. Genitourinary: Negative. Musculoskeletal: Positive for arthralgias (knee at times. ) and back pain. Skin: Negative. Allergic/Immunologic: Positive for environmental allergies. Neurological: Negative. Hematological: Negative. Psychiatric/Behavioral: Negative. All other systems reviewed and are negative. No Known Allergies      Prior to Visit Medications    Medication Sig Taking?  Authorizing Provider   rosuvastatin (CRESTOR) 10 MG tablet Take 1 tablet by mouth daily Yes Jamaica Ruelas PA-C   losartan (COZAAR) 25 MG tablet Take 1 tablet by mouth daily Yes Hortencia Dooley PA-C   montelukast (SINGULAIR) 10 MG tablet TAKE 1 TABLET BY MOUTH EVERY DAY Yes Hortencia Dooley PA-C   cyclobenzaprine (FLEXERIL) 10 mg tablet TAKE 1 TABLET BY MOUTH THREE TIMES A DAY AS NEEDED FOR MUSCLE SPASMS Yes Hortencia Dooley PA-C   betamethasone dipropionate 0.05 % cream APPLY TOPICALLY 2 TIMES DAILY TP AFFECTED AREA Yes Hortencia Dooley PA-C   albuterol sulfate  (90 Base) MCG/ACT inhaler Inhale 2 puffs into the lungs 4 times daily as needed for Wheezing Yes Grayce Sensor, PA-C   omeprazole (PRILOSEC) 20 MG delayed release capsule Take 1 capsule by mouth daily Yes Grayce Sensor, PA-C         Past Medical History:   Diagnosis Date    CAD (coronary artery disease)     age 43     GERD (gastroesophageal reflux disease)     Hyperlipidemia        Past Surgical History:   Procedure Laterality Date    CORONARY ARTERY BYPASS GRAFT      quadruple bypass age 43    JOINT REPLACEMENT      TOTAL HIP ARTHROPLASTY Left     mid 46s         Family History   Problem Relation Age of Onset    No Known Problems Mother     Cancer Father         unknown cancer, liver, alcoholic    Alcohol Abuse Father        Social History     Tobacco Use    Smoking status: Former Smoker     Packs/day: 1.00     Years: 30.00     Pack years: 30.00     Quit date: 10/15/2014     Years since quittin.6    Smokeless tobacco: Never Used   Substance Use Topics    Alcohol use: Yes     Comment: occasional, on special occasions     Drug use: No       Objective   /80   Pulse 75   Temp 97.2 °F (36.2 °C)   Ht 5' 11\" (1.803 m)   Wt 269 lb (122 kg)   SpO2 99%   BMI 37.52 kg/m²   Wt Readings from Last 3 Encounters:   22 269 lb (122 kg)   19 269 lb (122 kg)   19 269 lb (122 kg)     There were no vitals filed for this visit. Physical Exam  Vitals reviewed. Constitutional:       General: He is not in acute distress. Appearance: Normal appearance. He is normal weight. HENT:      Head: Normocephalic and atraumatic. Right Ear: Tympanic membrane, ear canal and external ear normal.      Left Ear: Tympanic membrane, ear canal and external ear normal.      Nose: Nose normal.      Mouth/Throat:      Mouth: Mucous membranes are moist.      Pharynx: Oropharynx is clear. No oropharyngeal exudate or posterior oropharyngeal erythema. Eyes:      General: No scleral icterus.      Extraocular Movements: Extraocular movements intact. Conjunctiva/sclera: Conjunctivae normal.      Pupils: Pupils are equal, round, and reactive to light. Neck:      Vascular: No carotid bruit. Cardiovascular:      Rate and Rhythm: Normal rate and regular rhythm. Pulses: Normal pulses. Heart sounds: Normal heart sounds. Pulmonary:      Effort: Pulmonary effort is normal. No respiratory distress. Breath sounds: Normal breath sounds. No wheezing, rhonchi or rales. Abdominal:      General: Abdomen is flat. Bowel sounds are normal.      Palpations: Abdomen is soft. There is no mass. Tenderness: There is no abdominal tenderness. There is no guarding or rebound. Hernia: No hernia is present. Musculoskeletal:         General: Normal range of motion. Cervical back: Normal range of motion and neck supple. Skin:     General: Skin is warm and dry. Capillary Refill: Capillary refill takes less than 2 seconds. Coloration: Skin is not jaundiced. Findings: No lesion or rash. Neurological:      General: No focal deficit present. Mental Status: He is alert and oriented to person, place, and time. Mental status is at baseline. Cranial Nerves: No cranial nerve deficit. Psychiatric:         Mood and Affect: Mood normal.         Behavior: Behavior normal.         Thought Content: Thought content normal.         Judgment: Judgment normal.           Assessment   Plan   1. Encounter for well adult exam without abnormal findings  2. Pure hypercholesterolemia  -     rosuvastatin (CRESTOR) 10 MG tablet; Take 1 tablet by mouth daily, Disp-90 tablet, R-2Normal  -     Comprehensive Metabolic Panel; Future  -     CBC; Future  -     LIPID PANEL; Future  3. Coronary artery disease involving coronary bypass graft of native heart without angina pectoris--refuses to see cardiology. No Sx. ECG yearly. -     rosuvastatin (CRESTOR) 10 MG tablet;  Take 1 tablet by mouth daily, Disp-90 tablet, R-2Normal  -     EKG 12 Lead  4. Hypertension, unspecified type  -     losartan (COZAAR) 25 MG tablet; Take 1 tablet by mouth daily, Disp-90 tablet, R-1Normal  5. Wheezing  -     montelukast (SINGULAIR) 10 MG tablet; TAKE 1 TABLET BY MOUTH EVERY DAY, Disp-90 tablet, R-1Normal  6. Seasonal allergies  -     montelukast (SINGULAIR) 10 MG tablet; TAKE 1 TABLET BY MOUTH EVERY DAY, Disp-90 tablet, R-1Normal  7. Lumbar strain, sequela  -     cyclobenzaprine (FLEXERIL) 10 mg tablet; TAKE 1 TABLET BY MOUTH THREE TIMES A DAY AS NEEDED FOR MUSCLE SPASMS, Disp-90 tablet, R-1Normal  8. Psoriasis  -     betamethasone dipropionate 0.05 % cream; APPLY TOPICALLY 2 TIMES DAILY TP AFFECTED AREA, Disp-45 g, R-5, Normal  9. Screening PSA (prostate specific antigen)  -     PSA Screening; Future  10.  Screening for colon cancer  -     Fecal DNA Colorectal cancer screening (Cologuard)         Personalized Preventive Plan   Current Health Maintenance Status  Immunization History   Administered Date(s) Administered    COVID-19, Pfizer Purple top, DILUTE for use, 12+ yrs, 30mcg/0.3mL dose 03/07/2021    Influenza A (Y4L2-42) Vaccine PF IM 11/03/2009    Influenza Virus Vaccine 11/20/2014    Influenza, Norva Mere, IM, PF (6 mo and older Fluzone, Flulaval, Fluarix, and 3 yrs and older Afluria) 10/13/2020        Health Maintenance   Topic Date Due    Pneumococcal 0-64 years Vaccine (1 - PCV) Never done    HIV screen  Never done    Hepatitis C screen  Never done    DTaP/Tdap/Td vaccine (1 - Tdap) Never done    Diabetes screen  Never done    Colorectal Cancer Screen  Never done    Shingles vaccine (1 of 2) Never done    Low dose CT lung screening  Never done    COVID-19 Vaccine (2 - Pfizer 3-dose series) 03/28/2021    Flu vaccine (Season Ended) 09/01/2022    Lipids  05/31/2023    Depression Screen  05/31/2023    Prostate Specific Antigen (PSA) Screening or Monitoring  05/31/2023    Hepatitis A vaccine  Aged Out    Hepatitis B vaccine  Aged Out    Hib vaccine Aged Out    Meningococcal (ACWY) vaccine  Aged Out     Recommendations for [facility] Due: see orders and patient instructions/AVS.    No follow-ups on file.

## 2022-06-02 ASSESSMENT — ENCOUNTER SYMPTOMS
GASTROINTESTINAL NEGATIVE: 1
RESPIRATORY NEGATIVE: 1
BACK PAIN: 1

## 2022-07-05 LAB — NONINV COLON CA DNA+OCC BLD SCRN STL QL: POSITIVE

## 2022-07-06 ENCOUNTER — TELEPHONE (OUTPATIENT)
Dept: PRIMARY CARE CLINIC | Age: 62
End: 2022-07-06

## 2022-07-06 DIAGNOSIS — R19.5 POSITIVE COLORECTAL CANCER SCREENING USING COLOGUARD TEST: Primary | ICD-10-CM

## 2022-07-06 NOTE — TELEPHONE ENCOUNTER
Patient had a positive Cologuard test and wife wants to review the results. Will you please review and advise? He has an appt to est with Dr Siobhan Soares but not until Sept 7.

## 2022-08-04 ENCOUNTER — OFFICE VISIT (OUTPATIENT)
Dept: SURGERY | Age: 62
End: 2022-08-04
Payer: COMMERCIAL

## 2022-08-04 VITALS
HEIGHT: 71 IN | BODY MASS INDEX: 37.8 KG/M2 | HEART RATE: 71 BPM | WEIGHT: 270 LBS | RESPIRATION RATE: 18 BRPM | SYSTOLIC BLOOD PRESSURE: 140 MMHG | TEMPERATURE: 97.3 F | OXYGEN SATURATION: 96 % | DIASTOLIC BLOOD PRESSURE: 85 MMHG

## 2022-08-04 DIAGNOSIS — R19.5 POSITIVE COLORECTAL CANCER SCREENING USING COLOGUARD TEST: Primary | ICD-10-CM

## 2022-08-04 PROCEDURE — 99203 OFFICE O/P NEW LOW 30 MIN: CPT | Performed by: SURGERY

## 2022-08-15 NOTE — PROGRESS NOTES
[facility] Note    Assessment/Plan:      Diagnosis Orders   1. Positive colorectal cancer screening using Cologuard test      We will plan for colonoscopy            Return for Colonoscopy. Chief Complaint   Patient presents with    New Patient     Positive colorectal cancer screening       PCP: Bhavana Forde PA-C    HPI: Alicia Fay is a 59 y.o. male who presents in consultation for positive Cologuard. He has not had prior colonoscopy. He denies any issues. He has no problems with diarrhea or constipation. There is no melena or hematochezia that he has noticed. There is no abdominal pain or unintentional weight loss. Past Medical History:   Diagnosis Date    CAD (coronary artery disease)     age 43     GERD (gastroesophageal reflux disease)     Hyperlipidemia        Past Surgical History:   Procedure Laterality Date    CORONARY ARTERY BYPASS GRAFT      quadruple bypass age 43    JOINT REPLACEMENT      TOTAL HIP ARTHROPLASTY Left     mid 46s       Prior to Admission medications    Medication Sig Start Date End Date Taking?  Authorizing Provider   rosuvastatin (CRESTOR) 10 MG tablet Take 1 tablet by mouth daily 5/31/22  Yes Bhavana Forde PA-C   losartan (COZAAR) 25 MG tablet Take 1 tablet by mouth daily 5/31/22  Yes Bhavana Forde PA-C   montelukast (SINGULAIR) 10 MG tablet TAKE 1 TABLET BY MOUTH EVERY DAY 5/31/22  Yes Hortencia Dooley PA-C   cyclobenzaprine (FLEXERIL) 10 mg tablet TAKE 1 TABLET BY MOUTH THREE TIMES A DAY AS NEEDED FOR MUSCLE SPASMS 5/31/22  Yes Bhavana Forde PA-C   betamethasone dipropionate 0.05 % cream APPLY TOPICALLY 2 TIMES DAILY TP AFFECTED AREA 5/31/22  Yes Bhavana Forde PA-C   albuterol sulfate  (90 Base) MCG/ACT inhaler Inhale 2 puffs into the lungs 4 times daily as needed for Wheezing 12/13/19  Yes Bhavana Forde PA-C   omeprazole (PRILOSEC) 20 MG delayed release capsule Take 1 capsule by mouth daily 4/26/19  Yes Bhavana Forde PA-C       No Known Allergies    Social History     Socioeconomic History    Marital status:      Spouse name: None    Number of children: None    Years of education: None    Highest education level: None   Tobacco Use    Smoking status: Former     Packs/day: 1.00     Years: 30.00     Pack years: 30.00     Types: Cigarettes     Quit date: 10/15/2014     Years since quittin.8    Smokeless tobacco: Never   Substance and Sexual Activity    Alcohol use: Yes     Comment: occasional, on special occasions     Drug use: No    Sexual activity: Yes     Partners: Female     Comment: wife   Social History Narrative    Works in construction(now retired), 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 . Social Determinants of Health     Financial Resource Strain: Low Risk     Difficulty of Paying Living Expenses: Not hard at all   Food Insecurity: No Food Insecurity    Worried About Running Out of Food in the Last Year: Never true    Ran Out of Food in the Last Year: Never true       Family History   Problem Relation Age of Onset    No Known Problems Mother     Cancer Father         unknown cancer, liver, alcoholic    Alcohol Abuse Father        Review of Systems   All other systems reviewed and are negative. Objective:  Vitals:    22 1510   BP: (!) 140/85   Pulse: 71   Resp: 18   Temp: 97.3 °F (36.3 °C)   TempSrc: Temporal   SpO2: 96%   Weight: 270 lb (122.5 kg)   Height: 5' 11\" (1.803 m)          Physical Exam  Constitutional:       General: He is not in acute distress. Appearance: He is not diaphoretic. HENT:      Head: Normocephalic and atraumatic. Eyes:      General:         Right eye: No discharge. Left eye: No discharge. Neck:      Trachea: No tracheal deviation. Cardiovascular:      Rate and Rhythm: Normal rate. Pulmonary:      Effort: Pulmonary effort is normal. No respiratory distress. Abdominal:      General: There is no distension. Palpations: Abdomen is soft. Tenderness:  There is no abdominal tenderness. There is no guarding or rebound. Skin:     General: Skin is warm and dry. Neurological:      Mental Status: He is alert and oriented to person, place, and time. Phyllis Glover MD      NOTE: This report, in part or full,may have been transcribed using voice recognition software. Every effort was made to ensure accuracy; however, inadvertent computerized transcription errors may be present. Please excuse any transcriptional grammatical or spelling errors that may have escaped my editorial review.     CC: Gerry Matias PA-C, Jerald Arnold PA-C

## 2022-08-17 ENCOUNTER — TELEPHONE (OUTPATIENT)
Dept: SURGERY | Age: 62
End: 2022-08-17

## 2022-08-17 NOTE — TELEPHONE ENCOUNTER
MA called to schedule patient's colonoscopy and patient stated that he decided to go with another physician.     Electronically signed by Chris Ribera MA on 8/17/2022 at 9:07 AM

## 2022-08-29 ENCOUNTER — HOSPITAL ENCOUNTER (OUTPATIENT)
Age: 62
Discharge: HOME | End: 2022-08-31

## 2022-08-29 PROCEDURE — 88305 TISSUE EXAM BY PATHOLOGIST: CPT

## 2022-09-07 ENCOUNTER — OFFICE VISIT (OUTPATIENT)
Dept: FAMILY MEDICINE CLINIC | Age: 62
End: 2022-09-07
Payer: COMMERCIAL

## 2022-09-07 VITALS
HEIGHT: 71 IN | RESPIRATION RATE: 18 BRPM | WEIGHT: 271 LBS | HEART RATE: 70 BPM | OXYGEN SATURATION: 95 % | BODY MASS INDEX: 37.94 KG/M2 | SYSTOLIC BLOOD PRESSURE: 134 MMHG | DIASTOLIC BLOOD PRESSURE: 83 MMHG | TEMPERATURE: 97.3 F

## 2022-09-07 DIAGNOSIS — Z95.1 HISTORY OF CORONARY ARTERY BYPASS GRAFT: ICD-10-CM

## 2022-09-07 DIAGNOSIS — E78.00 PURE HYPERCHOLESTEROLEMIA: ICD-10-CM

## 2022-09-07 DIAGNOSIS — K21.9 GASTROESOPHAGEAL REFLUX DISEASE WITHOUT ESOPHAGITIS: ICD-10-CM

## 2022-09-07 DIAGNOSIS — E66.01 CLASS 2 SEVERE OBESITY DUE TO EXCESS CALORIES WITH SERIOUS COMORBIDITY AND BODY MASS INDEX (BMI) OF 37.0 TO 37.9 IN ADULT (HCC): ICD-10-CM

## 2022-09-07 DIAGNOSIS — L40.9 PSORIASIS: ICD-10-CM

## 2022-09-07 DIAGNOSIS — I10 ESSENTIAL HYPERTENSION: Primary | ICD-10-CM

## 2022-09-07 PROBLEM — E66.812 CLASS 2 SEVERE OBESITY DUE TO EXCESS CALORIES WITH SERIOUS COMORBIDITY AND BODY MASS INDEX (BMI) OF 37.0 TO 37.9 IN ADULT: Status: ACTIVE | Noted: 2022-09-07

## 2022-09-07 PROCEDURE — 99214 OFFICE O/P EST MOD 30 MIN: CPT | Performed by: STUDENT IN AN ORGANIZED HEALTH CARE EDUCATION/TRAINING PROGRAM

## 2022-09-07 RX ORDER — ASPIRIN 81 MG/1
81 TABLET, CHEWABLE ORAL DAILY
COMMUNITY

## 2022-09-07 SDOH — HEALTH STABILITY: PHYSICAL HEALTH: ON AVERAGE, HOW MANY DAYS PER WEEK DO YOU ENGAGE IN MODERATE TO STRENUOUS EXERCISE (LIKE A BRISK WALK)?: 1 DAY

## 2022-09-07 SDOH — HEALTH STABILITY: PHYSICAL HEALTH: ON AVERAGE, HOW MANY MINUTES DO YOU ENGAGE IN EXERCISE AT THIS LEVEL?: 10 MIN

## 2022-09-07 ASSESSMENT — ENCOUNTER SYMPTOMS
ABDOMINAL PAIN: 0
SHORTNESS OF BREATH: 0
CONSTIPATION: 0
DIARRHEA: 0
BLOOD IN STOOL: 0
WHEEZING: 0
NAUSEA: 0
COUGH: 0

## 2022-09-07 NOTE — PROGRESS NOTES
Kaitlynn James (:  1960) is a 59 y.o. male, New patient , established at [facility],  here for evaluation of the following:  Establish Care         ASSESSMENT/PLAN  Patient seen to establish care, will continue current medications, did recommend he take the aspirin daily instead of only several times per week, continued elevation in LDL, recommended lifestyle changes for which she is precontemplative, declines A1c today    1. Essential hypertension  Chronic, well controlled, continue current medications and treatment plan   2. Gastroesophageal reflux disease without esophagitis  Chronic, well controlled, continue current medications and treatment plan   3. Pure hypercholesterolemia  Chronic, patient on statin, did recommend lifestyle changes, he seems precontemplative to make any dietary changes or increase activity, he used to walk 8 miles daily however no longer does this after knee replacement, is precontemplative to begin exercising again  4. Psoriasis  Chronic, well controlled, continue current medications and treatment plan      5. Class 2 severe obesity due to excess calories with serious comorbidity and body mass index (BMI) of 37.0 to 37.9 in adult [facility])  chronic, Uncontrolled, would benefit from increase in healthy diet and activity  6. History of coronary artery bypass graft  History of bypass, does not take aspirin daily, encouraged him to continue with aspirin and a statin to prevent heart attacks and strokes, he denies any current symptoms, is precontemplative to make lifestyle changes, but smoking and declines lung cancer screening  Return in about 6 months (around 3/7/2023) for Follow up chronic disease. Subjective   SUBJECTIVE/OBJECTIVE:  HPI  He is here to establish care. He had recent colonoscopy. Colonoscopy with a rectal polypectomy of tubular adenoma after having positive Cologuard  He does not check BP at home.    He has seen a cardiologist. At one point he had some chest pressure during a stressful time. He ended up with bypass surgery. Takes aspirin, not every day. He has some wheezing with pollen. He has pets. He uses the inhaler seldom. Declined preventative screening identified as care gaps unless ordered through this visit    PHQ2/PHQ9      No data recorded     Past Medical History:  has a past medical history of CAD (coronary artery disease), GERD (gastroesophageal reflux disease), and Hyperlipidemia. Past Surgical History:  has a past surgical history that includes Coronary artery bypass graft; Total hip arthroplasty (Left); and joint replacement. Social History:  reports that he quit smoking about 7 years ago. His smoking use included cigarettes. He has a 30.00 pack-year smoking history. He has never used smokeless tobacco. He reports current alcohol use. He reports that he does not use drugs. Family History: family history includes Alcohol Abuse in his father; Cancer in his father; No Known Problems in his mother. Allergies: Patient has no known allergies. Medications:   Current Outpatient Medications   Medication Sig Dispense Refill    aspirin 81 MG chewable tablet Take 81 mg by mouth daily      rosuvastatin (CRESTOR) 10 MG tablet Take 1 tablet by mouth daily 90 tablet 2    losartan (COZAAR) 25 MG tablet Take 1 tablet by mouth daily 90 tablet 1    montelukast (SINGULAIR) 10 MG tablet TAKE 1 TABLET BY MOUTH EVERY DAY 90 tablet 1    cyclobenzaprine (FLEXERIL) 10 mg tablet TAKE 1 TABLET BY MOUTH THREE TIMES A DAY AS NEEDED FOR MUSCLE SPASMS 90 tablet 1    albuterol sulfate  (90 Base) MCG/ACT inhaler Inhale 2 puffs into the lungs 4 times daily as needed for Wheezing 1 Inhaler 2    omeprazole (PRILOSEC) 20 MG delayed release capsule Take 1 capsule by mouth daily 90 capsule 1    betamethasone dipropionate 0.05 % cream APPLY TOPICALLY 2 TIMES DAILY TP AFFECTED AREA 45 g 5     No current facility-administered medications for this visit.       Allergies: Patient has no known allergies. Review of Systems   Constitutional:  Negative for chills, fatigue, fever and unexpected weight change. HENT:  Negative for hearing loss. Eyes:  Negative for visual disturbance. Respiratory:  Negative for cough, shortness of breath and wheezing. Cardiovascular:  Negative for chest pain, palpitations and leg swelling. Gastrointestinal:  Negative for abdominal pain, blood in stool, constipation, diarrhea and nausea. Genitourinary:  Negative for dysuria. Musculoskeletal:  Negative for arthralgias. Neurological:  Negative for weakness, light-headedness, numbness and headaches. Psychiatric/Behavioral:  Negative for dysphoric mood and sleep disturbance. The patient is not nervous/anxious. All other systems reviewed and are negative.        Objective   /83 (Site: Right Upper Arm, Position: Sitting, Cuff Size: Large Adult)   Pulse 70   Temp 97.3 °F (36.3 °C) (Temporal)   Resp 18   Ht 5' 11\" (1.803 m)   Wt 271 lb (122.9 kg)   SpO2 95%   BMI 37.80 kg/m²       No results found for: LABA1C  Lab Results   Component Value Date    CHOL 190 05/31/2022    CHOL 193 10/13/2020    CHOL 198 12/13/2019     Lab Results   Component Value Date    TRIG 113 05/31/2022    TRIG 148 10/13/2020    TRIG 75 12/13/2019     Lab Results   Component Value Date    HDL 44 05/31/2022    HDL 47 10/13/2020    HDL 39 12/13/2019     Lab Results   Component Value Date    LDLCALC 123 (H) 05/31/2022    LDLCALC 116 (H) 10/13/2020    LDLCALC 144 (H) 12/13/2019     Lab Results   Component Value Date    LABVLDL 23 05/31/2022    LABVLDL 30 10/13/2020    LABVLDL 15 12/13/2019     No results found for: CHOLHDLRATIO   Creatinine   Date Value Ref Range Status   05/31/2022 0.8 0.7 - 1.2 mg/dL Final   10/13/2020 0.8 0.7 - 1.2 mg/dL Final   12/13/2019 0.8 0.7 - 1.2 mg/dL Final       The 10-year ASCVD risk score (Chris De La Garza, et al., 2013) is: 14.3%    Values used to calculate the score:      Age: 59 years Sex: Male      Is Non- : No      Diabetic: No      Tobacco smoker: No      Systolic Blood Pressure: 827 mmHg      Is BP treated: Yes      HDL Cholesterol: 44 mg/dL      Total Cholesterol: 190 mg/dL     Physical Exam  Constitutional:       General: He is not in acute distress. Appearance: Normal appearance. HENT:      Head: Normocephalic and atraumatic. Right Ear: External ear normal.      Nose: Nose normal.      Mouth/Throat:      Mouth: Mucous membranes are moist.   Eyes:      Extraocular Movements: Extraocular movements intact. Conjunctiva/sclera: Conjunctivae normal.   Cardiovascular:      Rate and Rhythm: Normal rate and regular rhythm. Heart sounds: No murmur heard. Pulmonary:      Effort: Pulmonary effort is normal.      Breath sounds: Normal breath sounds. No wheezing. Musculoskeletal:         General: Normal range of motion. Cervical back: Normal range of motion and neck supple. Lymphadenopathy:      Cervical: No cervical adenopathy. Neurological:      General: No focal deficit present. Mental Status: He is alert. Psychiatric:         Mood and Affect: Mood normal.         Behavior: Behavior normal.           An electronic signature was used to authenticate this note. --Samir John MD       *NOTE: This report was transcribed using voice recognition software. Every effort was made to ensure accuracy; however, inadvertent computerized transcription errors may be present.

## 2023-01-05 ENCOUNTER — OFFICE VISIT (OUTPATIENT)
Dept: ORTHOPEDIC SURGERY | Age: 63
End: 2023-01-05

## 2023-01-05 ENCOUNTER — OFFICE VISIT (OUTPATIENT)
Dept: FAMILY MEDICINE CLINIC | Age: 63
End: 2023-01-05
Payer: COMMERCIAL

## 2023-01-05 VITALS
DIASTOLIC BLOOD PRESSURE: 102 MMHG | WEIGHT: 271 LBS | OXYGEN SATURATION: 95 % | TEMPERATURE: 97.6 F | SYSTOLIC BLOOD PRESSURE: 151 MMHG | HEART RATE: 76 BPM | HEIGHT: 71 IN | RESPIRATION RATE: 20 BRPM | BODY MASS INDEX: 37.94 KG/M2

## 2023-01-05 VITALS — WEIGHT: 271 LBS | BODY MASS INDEX: 37.94 KG/M2 | HEIGHT: 71 IN

## 2023-01-05 DIAGNOSIS — E78.00 PURE HYPERCHOLESTEROLEMIA: ICD-10-CM

## 2023-01-05 DIAGNOSIS — E66.01 CLASS 2 SEVERE OBESITY DUE TO EXCESS CALORIES WITH SERIOUS COMORBIDITY AND BODY MASS INDEX (BMI) OF 37.0 TO 37.9 IN ADULT (HCC): ICD-10-CM

## 2023-01-05 DIAGNOSIS — M65.332 TRIGGER MIDDLE FINGER OF LEFT HAND: ICD-10-CM

## 2023-01-05 DIAGNOSIS — M65.332 TRIGGER FINGER, LEFT MIDDLE FINGER: Primary | ICD-10-CM

## 2023-01-05 DIAGNOSIS — I10 ESSENTIAL HYPERTENSION: Primary | ICD-10-CM

## 2023-01-05 DIAGNOSIS — R73.9 ELEVATED BLOOD SUGAR: ICD-10-CM

## 2023-01-05 DIAGNOSIS — I10 HYPERTENSION, UNSPECIFIED TYPE: ICD-10-CM

## 2023-01-05 PROCEDURE — 99213 OFFICE O/P EST LOW 20 MIN: CPT | Performed by: STUDENT IN AN ORGANIZED HEALTH CARE EDUCATION/TRAINING PROGRAM

## 2023-01-05 PROCEDURE — 3077F SYST BP >= 140 MM HG: CPT | Performed by: STUDENT IN AN ORGANIZED HEALTH CARE EDUCATION/TRAINING PROGRAM

## 2023-01-05 PROCEDURE — 3079F DIAST BP 80-89 MM HG: CPT | Performed by: STUDENT IN AN ORGANIZED HEALTH CARE EDUCATION/TRAINING PROGRAM

## 2023-01-05 RX ORDER — TRIAMCINOLONE ACETONIDE 40 MG/ML
20 INJECTION, SUSPENSION INTRA-ARTICULAR; INTRAMUSCULAR ONCE
Status: COMPLETED | OUTPATIENT
Start: 2023-01-05 | End: 2023-01-05

## 2023-01-05 RX ORDER — BUPIVACAINE HYDROCHLORIDE 2.5 MG/ML
0.5 INJECTION, SOLUTION INFILTRATION; PERINEURAL ONCE
Status: COMPLETED | OUTPATIENT
Start: 2023-01-05 | End: 2023-01-05

## 2023-01-05 RX ORDER — LOSARTAN POTASSIUM 50 MG/1
50 TABLET ORAL DAILY
Qty: 90 TABLET | Refills: 3
Start: 2023-01-05

## 2023-01-05 RX ADMIN — BUPIVACAINE HYDROCHLORIDE 1.25 MG: 2.5 INJECTION, SOLUTION INFILTRATION; PERINEURAL at 09:46

## 2023-01-05 RX ADMIN — TRIAMCINOLONE ACETONIDE 20 MG: 40 INJECTION, SUSPENSION INTRA-ARTICULAR; INTRAMUSCULAR at 09:48

## 2023-01-05 ASSESSMENT — ENCOUNTER SYMPTOMS
SHORTNESS OF BREATH: 0
TROUBLE SWALLOWING: 0
ABDOMINAL PAIN: 0

## 2023-01-05 NOTE — PROGRESS NOTES
New Wrist/Hand Patient Visit     Referring Provider:   No referring provider defined for this encounter. CHIEF COMPLAINT: Left middle finger triggering       HPI:      Rey Schuler is a 57y.o. year old male who is right-hand dominant. He has had left middle finger pain and triggering for a number of months. He states that at night the symptoms get stuck down and he has to pop it back up. The pain is located on the palmar side of his middle finger. None of his other fingers hurt. He has had no injuries. He has never had any treatment for this problem in the past.  Pain is sharp and nonradiating. It is worse with flexion. Denies fever chills. Denies numbness tingling.     PAST MEDICAL HISTORY  Past Medical History:   Diagnosis Date    CAD (coronary artery disease)     age 43     GERD (gastroesophageal reflux disease)     Hyperlipidemia        PAST SURGICAL HISTORY  Past Surgical History:   Procedure Laterality Date    CORONARY ARTERY BYPASS GRAFT      quadruple bypass age 43    JOINT REPLACEMENT      TOTAL HIP ARTHROPLASTY Left     mid 46s       FAMILY HISTORY   Family History   Problem Relation Age of Onset    No Known Problems Mother     Cancer Father         unknown cancer, liver, alcoholic    Alcohol Abuse Father        SOCIAL HISTORY  Social History     Occupational History    Not on file   Tobacco Use    Smoking status: Former     Packs/day: 1.00     Years: 30.00     Pack years: 30.00     Types: Cigarettes     Quit date: 10/15/2014     Years since quittin.2    Smokeless tobacco: Never   Substance and Sexual Activity    Alcohol use: Yes     Comment: occasional, on special occasions     Drug use: No    Sexual activity: Yes     Partners: Female     Comment: wife       CURRENT MEDICATIONS     Current Outpatient Medications:     losartan (COZAAR) 50 MG tablet, Take 1 tablet by mouth daily, Disp: 90 tablet, Rfl: 3    aspirin 81 MG chewable tablet, Take 81 mg by mouth daily, Disp: , Rfl: rosuvastatin (CRESTOR) 10 MG tablet, Take 1 tablet by mouth daily, Disp: 90 tablet, Rfl: 2    montelukast (SINGULAIR) 10 MG tablet, TAKE 1 TABLET BY MOUTH EVERY DAY, Disp: 90 tablet, Rfl: 1    cyclobenzaprine (FLEXERIL) 10 mg tablet, TAKE 1 TABLET BY MOUTH THREE TIMES A DAY AS NEEDED FOR MUSCLE SPASMS, Disp: 90 tablet, Rfl: 1    betamethasone dipropionate 0.05 % cream, APPLY TOPICALLY 2 TIMES DAILY TP AFFECTED AREA, Disp: 45 g, Rfl: 5    albuterol sulfate  (90 Base) MCG/ACT inhaler, Inhale 2 puffs into the lungs 4 times daily as needed for Wheezing, Disp: 1 Inhaler, Rfl: 2    omeprazole (PRILOSEC) 20 MG delayed release capsule, Take 1 capsule by mouth daily, Disp: 90 capsule, Rfl: 1    ALLERGIES  No Known Allergies    Controlled Substances Monitoring:        REVIEW OF SYSTEMS:     Constitutional:  Negative for weight loss, fevers, chills, fatigue  Cardiovascular: Negative for chest pain, palpitations  Pulmonary: Negative for shortness of breath, labored breathing, cough  GI: negative for abdominal pain, nausea, vomitting   MSK: per HPI  Skin: negative for rash, open wounds    All other systems reviewed and are negative           PHYSICAL EXAM     Vitals:    01/05/23 0946   Weight: 271 lb (122.9 kg)   Height: 5' 11\" (1.803 m)       Height: 5' 11\" (1.803 m)  Weight: [unfilled]  BMI:  Body mass index is 37.8 kg/m². General: The patient is alert and oriented x 3, appears to be stated age and in no distress. HEENT: head is normocephalic, atraumatic. EOMI. Neck: supple, trachea midline, no thyromegaly   Cardiovascular: peripheral pulses palpable. Normal Capillary refill   Respiratory: breathing unlabored, chest expansion symmetric   Skin: no rash, no open wounds, no erythema  Psych: normal affect; mood stable  Neurologic: gait normal, sensation grossly intact in extremities  MSK:      Hand/Wrist:  Left hand: He is tender to palpation over the A1 pulley at the base of his left middle finger.   There is a palpable nodule. The finger does get stuck down and can manually be extended. He has full flexion and about 1 degrees shy of full extension at his PIP joint of the finger. Fingers warm and well-perfused. The remainder of his hand exam is normal.  Sensation light touch is intact radial ulnar median nerve distribution      IMAGING:     No imaging today    Radiographic findings reviewed with patient    ASSESSMENT  Left middle finger trigger finger      PLAN  -Plan discussed in detail with the patient. He is suffering from a trigger finger of his middle finger. We discussed how this usually responds to injections. We are going to give him a steroid injection today in his A1 pulley. If this fails to provide relief he is going to call and set up an appointment to talk about surgical management including trigger release. He is happy with this treatment plan. All of his questions answered in detail. He can follow-up as needed. Left middle finger injection:  Left middle finger A1 pulley was identified as site of injection. Under aseptic technique 20 mg of Kenalog and 0.5 cc of 0.25% Marcaine were injected into his A1 pulley of his left middle finger. A sterile dressing was applied. He tolerated the procedure.           Josephine Hernandez,   Orthopaedic Surgery   1/5/23   9:39 AM EST

## 2023-01-05 NOTE — PROGRESS NOTES
Kaitlynn James (:  1960) is a 57 y.o. male, established patient follow up , here for evaluation of the following:  Hand Pain (Left hand. Pt states left hand is locking up and pain shooting up arm. Get worst towards the evening. Locks up when pt closes fist )         ASSESSMENT/PLAN      1. Essential hypertension  -     CBC with Auto Differential; Future  -     Comprehensive Metabolic Panel; Future  -     TSH; Future  2. Hypertension, unspecified type  Chronic, not well controlled, he takes his medicines intermittently, will increase losartan to 50 mg, he has many 25mg at home to take 2, will try to take it more often and check blood pressures  -     losartan (COZAAR) 50 MG tablet; Take 1 tablet by mouth daily, Disp-90 tablet, R-3Adjust Sig  3. Class 2 severe obesity due to excess calories with serious comorbidity and body mass index (BMI) of 37.0 to 37.9 in adult (HCC)  Chronic, precontemplative, did discuss healthy diet  4. Pure hypercholesterolemia  Chronic, 18% ASCVD risk, will repeat, can consider additional medications  -     Lipid Panel; Future  5. Elevated blood sugar  -     Hemoglobin A1C; Future  6. Trigger middle finger of left hand  He does have trigger finger of the middle finger, nodule can be felt in the palm, Ortho is here today and so they did see patient administer a injection, did have discussion about the efficacy of the injections patient was concerned he would have to be continually receiving these injections, looks like 50% of the people are still having relief after 12 months, he feels this is an appropriate treatment for him as it has been going on for a while, he declined hand physical therapy  Return in about 4 months (around 2023). Subjective   SUBJECTIVE/OBJECTIVE:  HPI  He is here with catching finger left 3rd finger, Has been going on a couple months.  He is right handed but he has bursitis in his right but has now been using left more often, In the evening pain radiates up arm. Has not not taken BP meds. He has elevated BP's at home. He will increase to 2 pills a day. He notes arthtirtis over his whole body. Has right knee replacement but feels \" liek a rock\" And hard to get up off ground. Declined preventative screening identified as care gaps unless ordered through this visit    PHQ2/PHQ9      No data recorded     Past Medical History:  has a past medical history of CAD (coronary artery disease), GERD (gastroesophageal reflux disease), and Hyperlipidemia. Past Surgical History:  has a past surgical history that includes Coronary artery bypass graft; Total hip arthroplasty (Left); and joint replacement. Social History:  reports that he quit smoking about 8 years ago. His smoking use included cigarettes. He has a 30.00 pack-year smoking history. He has never used smokeless tobacco. He reports current alcohol use. He reports that he does not use drugs. Family History: family history includes Alcohol Abuse in his father; Cancer in his father; No Known Problems in his mother. Allergies: Patient has no known allergies.   Medications:   Current Outpatient Medications   Medication Sig Dispense Refill    losartan (COZAAR) 50 MG tablet Take 1 tablet by mouth daily 90 tablet 3    aspirin 81 MG chewable tablet Take 81 mg by mouth daily      rosuvastatin (CRESTOR) 10 MG tablet Take 1 tablet by mouth daily 90 tablet 2    montelukast (SINGULAIR) 10 MG tablet TAKE 1 TABLET BY MOUTH EVERY DAY 90 tablet 1    cyclobenzaprine (FLEXERIL) 10 mg tablet TAKE 1 TABLET BY MOUTH THREE TIMES A DAY AS NEEDED FOR MUSCLE SPASMS 90 tablet 1    betamethasone dipropionate 0.05 % cream APPLY TOPICALLY 2 TIMES DAILY TP AFFECTED AREA 45 g 5    albuterol sulfate  (90 Base) MCG/ACT inhaler Inhale 2 puffs into the lungs 4 times daily as needed for Wheezing 1 Inhaler 2    omeprazole (PRILOSEC) 20 MG delayed release capsule Take 1 capsule by mouth daily 90 capsule 1     No current facility-administered medications for this visit. Allergies: Patient has no known allergies. Review of Systems   Constitutional:  Negative for activity change, appetite change, fatigue, fever and unexpected weight change. HENT:  Negative for trouble swallowing. Eyes:  Negative for visual disturbance. Respiratory:  Negative for shortness of breath. Cardiovascular:  Negative for chest pain. Gastrointestinal:  Negative for abdominal pain. Musculoskeletal:  Negative for arthralgias. Neurological:  Negative for weakness, light-headedness and headaches. Psychiatric/Behavioral:  Negative for confusion and sleep disturbance. All other systems reviewed and are negative.        Objective   BP (!) 151/102   Pulse 76   Temp 97.6 °F (36.4 °C) (Temporal)   Resp 20   Ht 5' 11\" (1.803 m)   Wt 271 lb (122.9 kg)   SpO2 95%   BMI 37.80 kg/m²       No results found for: LABA1C  Lab Results   Component Value Date    CHOL 190 05/31/2022    CHOL 193 10/13/2020    CHOL 198 12/13/2019     Lab Results   Component Value Date    TRIG 113 05/31/2022    TRIG 148 10/13/2020    TRIG 75 12/13/2019     Lab Results   Component Value Date    HDL 44 05/31/2022    HDL 47 10/13/2020    HDL 39 12/13/2019     Lab Results   Component Value Date    LDLCALC 123 (H) 05/31/2022    LDLCALC 116 (H) 10/13/2020    LDLCALC 144 (H) 12/13/2019     Lab Results   Component Value Date    LABVLDL 23 05/31/2022    LABVLDL 30 10/13/2020    LABVLDL 15 12/13/2019     No results found for: CHOLHDLRATIO   Creatinine   Date Value Ref Range Status   05/31/2022 0.8 0.7 - 1.2 mg/dL Final   10/13/2020 0.8 0.7 - 1.2 mg/dL Final   12/13/2019 0.8 0.7 - 1.2 mg/dL Final       The 10-year ASCVD risk score (Jayant GONZALEZ, et al., 2019) is: 18.7%    Values used to calculate the score:      Age: 57 years      Sex: Male      Is Non- : No      Diabetic: No      Tobacco smoker: No      Systolic Blood Pressure: 631 mmHg      Is BP treated: Yes      HDL Cholesterol: 44 mg/dL      Total Cholesterol: 190 mg/dL     Physical Exam  Constitutional:       General: He is not in acute distress. Appearance: Normal appearance. HENT:      Head: Normocephalic and atraumatic. Right Ear: External ear normal.      Left Ear: External ear normal.      Nose: Nose normal.      Mouth/Throat:      Mouth: Mucous membranes are moist.   Eyes:      Extraocular Movements: Extraocular movements intact. Conjunctiva/sclera: Conjunctivae normal.   Cardiovascular:      Rate and Rhythm: Normal rate and regular rhythm. Heart sounds: No murmur heard. Pulmonary:      Effort: Pulmonary effort is normal.      Breath sounds: Normal breath sounds. No wheezing. Musculoskeletal:         General: Normal range of motion. Neurological:      General: No focal deficit present. Mental Status: He is alert. Psychiatric:         Mood and Affect: Mood normal.         Behavior: Behavior normal.           An electronic signature was used to authenticate this note. --Zaid Xiong MD       *NOTE: This report was transcribed using voice recognition software. Every effort was made to ensure accuracy; however, inadvertent computerized transcription errors may be present.

## 2023-01-19 ENCOUNTER — APPOINTMENT (OUTPATIENT)
Dept: GENERAL RADIOLOGY | Age: 63
End: 2023-01-19
Payer: COMMERCIAL

## 2023-01-19 ENCOUNTER — HOSPITAL ENCOUNTER (EMERGENCY)
Age: 63
Discharge: ANOTHER HOSPITAL | End: 2023-01-19
Attending: EMERGENCY MEDICINE
Payer: COMMERCIAL

## 2023-01-19 ENCOUNTER — HOSPITAL ENCOUNTER (INPATIENT)
Age: 63
LOS: 2 days | Discharge: HOME | DRG: 200 | End: 2023-01-22
Attending: EMERGENCY MEDICINE | Admitting: SURGERY
Payer: COMMERCIAL

## 2023-01-19 ENCOUNTER — APPOINTMENT (OUTPATIENT)
Dept: CT IMAGING | Age: 63
End: 2023-01-19
Payer: COMMERCIAL

## 2023-01-19 VITALS
HEART RATE: 74 BPM | DIASTOLIC BLOOD PRESSURE: 85 MMHG | OXYGEN SATURATION: 100 % | SYSTOLIC BLOOD PRESSURE: 132 MMHG | RESPIRATION RATE: 19 BRPM | WEIGHT: 265 LBS | BODY MASS INDEX: 35.89 KG/M2 | TEMPERATURE: 97.6 F | HEIGHT: 72 IN

## 2023-01-19 DIAGNOSIS — S22.31XA CLOSED FRACTURE OF ONE RIB OF RIGHT SIDE, INITIAL ENCOUNTER: ICD-10-CM

## 2023-01-19 DIAGNOSIS — S27.0XXA TRAUMATIC PNEUMOTHORAX, INITIAL ENCOUNTER: ICD-10-CM

## 2023-01-19 DIAGNOSIS — S16.1XXA STRAIN OF NECK MUSCLE, INITIAL ENCOUNTER: ICD-10-CM

## 2023-01-19 DIAGNOSIS — S09.90XA CLOSED HEAD INJURY, INITIAL ENCOUNTER: ICD-10-CM
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
## 2023-01-19 DIAGNOSIS — S27.0XXA TRAUMATIC PNEUMOTHORAX, INITIAL ENCOUNTER: Primary | ICD-10-CM

## 2023-01-19 DIAGNOSIS — S22.31XA CLOSED FRACTURE OF ONE RIB OF RIGHT SIDE, INITIAL ENCOUNTER: Primary | ICD-10-CM

## 2023-01-19 DIAGNOSIS — S27.321A CONTUSION OF RIGHT LUNG, INITIAL ENCOUNTER: ICD-10-CM

## 2023-01-19 LAB
BASOPHILS ABSOLUTE: 0.04 E9/L (ref 0–0.2)
BASOPHILS RELATIVE PERCENT: 0.3 % (ref 0–2)
EOSINOPHILS ABSOLUTE: 0.02 E9/L (ref 0.05–0.5)
EOSINOPHILS RELATIVE PERCENT: 0.1 % (ref 0–6)
HCT VFR BLD CALC: 45.6 % (ref 37–54)
HEMOGLOBIN: 14.9 G/DL (ref 12.5–16.5)
IMMATURE GRANULOCYTES #: 0.07 E9/L
IMMATURE GRANULOCYTES %: 0.5 % (ref 0–5)
INR BLD: 1.1
LYMPHOCYTES ABSOLUTE: 1.17 E9/L (ref 1.5–4)
LYMPHOCYTES RELATIVE PERCENT: 7.6 % (ref 20–42)
MCH RBC QN AUTO: 31.1 PG (ref 26–35)
MCHC RBC AUTO-ENTMCNC: 32.7 % (ref 32–34.5)
MCV RBC AUTO: 95.2 FL (ref 80–99.9)
MONOCYTES ABSOLUTE: 0.65 E9/L (ref 0.1–0.95)
MONOCYTES RELATIVE PERCENT: 4.2 % (ref 2–12)
NEUTROPHILS ABSOLUTE: 13.37 E9/L (ref 1.8–7.3)
NEUTROPHILS RELATIVE PERCENT: 87.3 % (ref 43–80)
PDW BLD-RTO: 13.2 FL (ref 11.5–15)
PLATELET # BLD: 302 E9/L (ref 130–450)
PMV BLD AUTO: 9.2 FL (ref 7–12)
PROTHROMBIN TIME: 12.5 SEC (ref 9.3–12.4)
RBC # BLD: 4.79 E12/L (ref 3.8–5.8)
REASON FOR REJECTION: NORMAL
REJECTED TEST: NORMAL
WBC # BLD: 15.3 E9/L (ref 4.5–11.5)

## 2023-01-19 PROCEDURE — 99285 EMERGENCY DEPT VISIT HI MDM: CPT

## 2023-01-19 PROCEDURE — 6370000000 HC RX 637 (ALT 250 FOR IP): Performed by: NURSE PRACTITIONER

## 2023-01-19 PROCEDURE — 85610 PROTHROMBIN TIME: CPT

## 2023-01-19 PROCEDURE — 96374 THER/PROPH/DIAG INJ IV PUSH: CPT

## 2023-01-19 PROCEDURE — 6360000002 HC RX W HCPCS: Performed by: EMERGENCY MEDICINE

## 2023-01-19 PROCEDURE — 71045 X-RAY EXAM CHEST 1 VIEW: CPT

## 2023-01-19 PROCEDURE — 71250 CT THORAX DX C-: CPT

## 2023-01-19 PROCEDURE — 72125 CT NECK SPINE W/O DYE: CPT

## 2023-01-19 PROCEDURE — 32551 INSERTION OF CHEST TUBE: CPT

## 2023-01-19 PROCEDURE — 70450 CT HEAD/BRAIN W/O DYE: CPT

## 2023-01-19 PROCEDURE — 85025 COMPLETE CBC W/AUTO DIFF WBC: CPT

## 2023-01-19 PROCEDURE — 73030 X-RAY EXAM OF SHOULDER: CPT

## 2023-01-19 RX ORDER — FENTANYL CITRATE 50 UG/ML
100 INJECTION, SOLUTION INTRAMUSCULAR; INTRAVENOUS ONCE
Status: COMPLETED | OUTPATIENT
Start: 2023-01-19 | End: 2023-01-19

## 2023-01-19 RX ORDER — FENTANYL CITRATE 50 UG/ML
25 INJECTION, SOLUTION INTRAMUSCULAR; INTRAVENOUS ONCE
Status: COMPLETED | OUTPATIENT
Start: 2023-01-19 | End: 2023-01-19

## 2023-01-19 RX ORDER — HYDROCODONE BITARTRATE AND ACETAMINOPHEN 5; 325 MG/1; MG/1
1 TABLET ORAL ONCE
Status: COMPLETED | OUTPATIENT
Start: 2023-01-19 | End: 2023-01-19

## 2023-01-19 RX ADMIN — FENTANYL CITRATE 25 MCG: 50 INJECTION, SOLUTION INTRAMUSCULAR; INTRAVENOUS at 23:28

## 2023-01-19 RX ADMIN — HYDROCODONE BITARTRATE AND ACETAMINOPHEN 1 TABLET: 5; 325 TABLET ORAL at 19:45

## 2023-01-19 RX ADMIN — FENTANYL CITRATE 100 MCG: 50 INJECTION INTRAMUSCULAR; INTRAVENOUS at 22:06

## 2023-01-19 ASSESSMENT — ENCOUNTER SYMPTOMS
BACK PAIN: 0
EYE PAIN: 0
SINUS PRESSURE: 0
SHORTNESS OF BREATH: 0
DIARRHEA: 0
EYE REDNESS: 0
NAUSEA: 0
EYE DISCHARGE: 0
COUGH: 0
ABDOMINAL PAIN: 0
SORE THROAT: 0
VOMITING: 0
WHEEZING: 0

## 2023-01-19 ASSESSMENT — PAIN SCALES - GENERAL
PAINLEVEL_OUTOF10: 7
PAINLEVEL_OUTOF10: 7

## 2023-01-19 ASSESSMENT — PAIN - FUNCTIONAL ASSESSMENT: PAIN_FUNCTIONAL_ASSESSMENT: 0-10

## 2023-01-20 ENCOUNTER — APPOINTMENT (OUTPATIENT)
Dept: GENERAL RADIOLOGY | Age: 63
DRG: 200 | End: 2023-01-20
Payer: COMMERCIAL

## 2023-01-20 PROBLEM — J93.9 PNEUMOTHORAX: Status: ACTIVE | Noted: 2023-01-20

## 2023-01-20 LAB
ANION GAP SERPL CALCULATED.3IONS-SCNC: 12 MMOL/L (ref 7–16)
BASOPHILS ABSOLUTE: 0.03 E9/L (ref 0–0.2)
BASOPHILS RELATIVE PERCENT: 0.3 % (ref 0–2)
BUN BLDV-MCNC: 13 MG/DL (ref 6–23)
CALCIUM SERPL-MCNC: 8.7 MG/DL (ref 8.6–10.2)
CHLORIDE BLD-SCNC: 103 MMOL/L (ref 98–107)
CO2: 21 MMOL/L (ref 22–29)
CREAT SERPL-MCNC: 0.7 MG/DL (ref 0.7–1.2)
EOSINOPHILS ABSOLUTE: 0.06 E9/L (ref 0.05–0.5)
EOSINOPHILS RELATIVE PERCENT: 0.7 % (ref 0–6)
GFR SERPL CREATININE-BSD FRML MDRD: >60 ML/MIN/1.73
GLUCOSE BLD-MCNC: 126 MG/DL (ref 74–99)
HCT VFR BLD CALC: 38.7 % (ref 37–54)
HEMOGLOBIN: 12.9 G/DL (ref 12.5–16.5)
IMMATURE GRANULOCYTES #: 0.03 E9/L
IMMATURE GRANULOCYTES %: 0.3 % (ref 0–5)
LYMPHOCYTES ABSOLUTE: 1.44 E9/L (ref 1.5–4)
LYMPHOCYTES RELATIVE PERCENT: 15.9 % (ref 20–42)
MCH RBC QN AUTO: 31 PG (ref 26–35)
MCHC RBC AUTO-ENTMCNC: 33.3 % (ref 32–34.5)
MCV RBC AUTO: 93 FL (ref 80–99.9)
MONOCYTES ABSOLUTE: 0.6 E9/L (ref 0.1–0.95)
MONOCYTES RELATIVE PERCENT: 6.6 % (ref 2–12)
NEUTROPHILS ABSOLUTE: 6.88 E9/L (ref 1.8–7.3)
NEUTROPHILS RELATIVE PERCENT: 76.2 % (ref 43–80)
PDW BLD-RTO: 13.4 FL (ref 11.5–15)
PLATELET # BLD: 237 E9/L (ref 130–450)
PMV BLD AUTO: 9.2 FL (ref 7–12)
POTASSIUM REFLEX MAGNESIUM: 3.8 MMOL/L (ref 3.5–5)
RBC # BLD: 4.16 E12/L (ref 3.8–5.8)
SODIUM BLD-SCNC: 136 MMOL/L (ref 132–146)
WBC # BLD: 9 E9/L (ref 4.5–11.5)

## 2023-01-20 PROCEDURE — 2700000000 HC OXYGEN THERAPY PER DAY

## 2023-01-20 PROCEDURE — 6370000000 HC RX 637 (ALT 250 FOR IP)

## 2023-01-20 PROCEDURE — 99223 1ST HOSP IP/OBS HIGH 75: CPT | Performed by: SURGERY

## 2023-01-20 PROCEDURE — 85025 COMPLETE CBC W/AUTO DIFF WBC: CPT

## 2023-01-20 PROCEDURE — 1200000000 HC SEMI PRIVATE

## 2023-01-20 PROCEDURE — 6370000000 HC RX 637 (ALT 250 FOR IP): Performed by: STUDENT IN AN ORGANIZED HEALTH CARE EDUCATION/TRAINING PROGRAM

## 2023-01-20 PROCEDURE — 2580000003 HC RX 258

## 2023-01-20 PROCEDURE — 80048 BASIC METABOLIC PNL TOTAL CA: CPT

## 2023-01-20 PROCEDURE — 71045 X-RAY EXAM CHEST 1 VIEW: CPT

## 2023-01-20 RX ORDER — POTASSIUM CHLORIDE 20 MEQ/1
40 TABLET, EXTENDED RELEASE ORAL ONCE
Status: COMPLETED | OUTPATIENT
Start: 2023-01-20 | End: 2023-01-20

## 2023-01-20 RX ORDER — POLYETHYLENE GLYCOL 3350 17 G/17G
17 POWDER, FOR SOLUTION ORAL DAILY
Status: DISCONTINUED | OUTPATIENT
Start: 2023-01-20 | End: 2023-01-22 | Stop reason: HOSPADM

## 2023-01-20 RX ORDER — OXYCODONE HYDROCHLORIDE 5 MG/1
5 TABLET ORAL EVERY 4 HOURS PRN
Status: DISCONTINUED | OUTPATIENT
Start: 2023-01-20 | End: 2023-01-22 | Stop reason: HOSPADM

## 2023-01-20 RX ORDER — SODIUM CHLORIDE 0.9 % (FLUSH) 0.9 %
10 SYRINGE (ML) INJECTION EVERY 12 HOURS SCHEDULED
Status: DISCONTINUED | OUTPATIENT
Start: 2023-01-20 | End: 2023-01-22 | Stop reason: HOSPADM

## 2023-01-20 RX ORDER — OXYCODONE HYDROCHLORIDE 10 MG/1
10 TABLET ORAL EVERY 4 HOURS PRN
Status: DISCONTINUED | OUTPATIENT
Start: 2023-01-20 | End: 2023-01-22 | Stop reason: HOSPADM

## 2023-01-20 RX ORDER — ALBUTEROL SULFATE 2.5 MG/3ML
2.5 SOLUTION RESPIRATORY (INHALATION) 4 TIMES DAILY PRN
Status: DISCONTINUED | OUTPATIENT
Start: 2023-01-20 | End: 2023-01-22 | Stop reason: HOSPADM

## 2023-01-20 RX ORDER — ONDANSETRON 4 MG/1
4 TABLET, ORALLY DISINTEGRATING ORAL EVERY 8 HOURS PRN
Status: DISCONTINUED | OUTPATIENT
Start: 2023-01-20 | End: 2023-01-22 | Stop reason: HOSPADM

## 2023-01-20 RX ORDER — METHOCARBAMOL 500 MG/1
1000 TABLET, FILM COATED ORAL 4 TIMES DAILY
Status: DISCONTINUED | OUTPATIENT
Start: 2023-01-20 | End: 2023-01-22 | Stop reason: HOSPADM

## 2023-01-20 RX ORDER — SODIUM CHLORIDE 0.9 % (FLUSH) 0.9 %
10 SYRINGE (ML) INJECTION PRN
Status: DISCONTINUED | OUTPATIENT
Start: 2023-01-20 | End: 2023-01-22 | Stop reason: HOSPADM

## 2023-01-20 RX ORDER — PANTOPRAZOLE SODIUM 40 MG/1
40 TABLET, DELAYED RELEASE ORAL
Status: DISCONTINUED | OUTPATIENT
Start: 2023-01-21 | End: 2023-01-22 | Stop reason: HOSPADM

## 2023-01-20 RX ORDER — ONDANSETRON 2 MG/ML
4 INJECTION INTRAMUSCULAR; INTRAVENOUS EVERY 6 HOURS PRN
Status: DISCONTINUED | OUTPATIENT
Start: 2023-01-20 | End: 2023-01-22 | Stop reason: HOSPADM

## 2023-01-20 RX ORDER — ROSUVASTATIN CALCIUM 10 MG/1
10 TABLET, COATED ORAL DAILY
Status: DISCONTINUED | OUTPATIENT
Start: 2023-01-20 | End: 2023-01-22 | Stop reason: HOSPADM

## 2023-01-20 RX ORDER — LOSARTAN POTASSIUM 50 MG/1
50 TABLET ORAL DAILY
Status: DISCONTINUED | OUTPATIENT
Start: 2023-01-20 | End: 2023-01-22 | Stop reason: HOSPADM

## 2023-01-20 RX ORDER — SODIUM CHLORIDE 9 MG/ML
INJECTION, SOLUTION INTRAVENOUS PRN
Status: DISCONTINUED | OUTPATIENT
Start: 2023-01-20 | End: 2023-01-22 | Stop reason: HOSPADM

## 2023-01-20 RX ORDER — MONTELUKAST SODIUM 10 MG/1
10 TABLET ORAL DAILY
Status: DISCONTINUED | OUTPATIENT
Start: 2023-01-20 | End: 2023-01-22 | Stop reason: HOSPADM

## 2023-01-20 RX ORDER — ACETAMINOPHEN 325 MG/1
650 TABLET ORAL EVERY 6 HOURS
Status: DISCONTINUED | OUTPATIENT
Start: 2023-01-20 | End: 2023-01-22 | Stop reason: HOSPADM

## 2023-01-20 RX ADMIN — ACETAMINOPHEN 650 MG: 325 TABLET ORAL at 14:49

## 2023-01-20 RX ADMIN — METHOCARBAMOL 1000 MG: 500 TABLET ORAL at 12:48

## 2023-01-20 RX ADMIN — POTASSIUM CHLORIDE 40 MEQ: 1500 TABLET, EXTENDED RELEASE ORAL at 09:46

## 2023-01-20 RX ADMIN — OXYCODONE HYDROCHLORIDE 10 MG: 10 TABLET ORAL at 13:56

## 2023-01-20 RX ADMIN — METHOCARBAMOL 1000 MG: 500 TABLET ORAL at 22:21

## 2023-01-20 RX ADMIN — OXYCODONE HYDROCHLORIDE 10 MG: 10 TABLET ORAL at 18:51

## 2023-01-20 RX ADMIN — METHOCARBAMOL 1000 MG: 500 TABLET ORAL at 16:38

## 2023-01-20 RX ADMIN — SODIUM CHLORIDE, PRESERVATIVE FREE 10 ML: 5 INJECTION INTRAVENOUS at 22:24

## 2023-01-20 RX ADMIN — MONTELUKAST 10 MG: 10 TABLET, FILM COATED ORAL at 12:47

## 2023-01-20 RX ADMIN — ACETAMINOPHEN 650 MG: 325 TABLET ORAL at 09:47

## 2023-01-20 RX ADMIN — OXYCODONE 5 MG: 5 TABLET ORAL at 03:55

## 2023-01-20 RX ADMIN — OXYCODONE HYDROCHLORIDE 10 MG: 10 TABLET ORAL at 22:21

## 2023-01-20 RX ADMIN — SODIUM CHLORIDE, PRESERVATIVE FREE 10 ML: 5 INJECTION INTRAVENOUS at 09:46

## 2023-01-20 RX ADMIN — ACETAMINOPHEN 650 MG: 325 TABLET ORAL at 03:55

## 2023-01-20 RX ADMIN — METHOCARBAMOL 1000 MG: 500 TABLET ORAL at 09:47

## 2023-01-20 RX ADMIN — LOSARTAN POTASSIUM 50 MG: 50 TABLET, FILM COATED ORAL at 09:46

## 2023-01-20 RX ADMIN — OXYCODONE HYDROCHLORIDE 10 MG: 10 TABLET ORAL at 09:47

## 2023-01-20 RX ADMIN — ROSUVASTATIN 10 MG: 10 TABLET, FILM COATED ORAL at 12:50

## 2023-01-20 RX ADMIN — ACETAMINOPHEN 650 MG: 325 TABLET ORAL at 22:21

## 2023-01-20 ASSESSMENT — PAIN DESCRIPTION - ORIENTATION
ORIENTATION: RIGHT

## 2023-01-20 ASSESSMENT — PAIN DESCRIPTION - ONSET
ONSET: ON-GOING
ONSET: ON-GOING

## 2023-01-20 ASSESSMENT — PAIN SCALES - GENERAL
PAINLEVEL_OUTOF10: 2
PAINLEVEL_OUTOF10: 7
PAINLEVEL_OUTOF10: 3
PAINLEVEL_OUTOF10: 7
PAINLEVEL_OUTOF10: 7

## 2023-01-20 ASSESSMENT — PAIN DESCRIPTION - LOCATION
LOCATION: RIB CAGE;CHEST
LOCATION: CHEST;RIB CAGE
LOCATION: CHEST;RIB CAGE

## 2023-01-20 ASSESSMENT — PAIN DESCRIPTION - DESCRIPTORS
DESCRIPTORS: ACHING;SHARP
DESCRIPTORS: DISCOMFORT;SHARP;SORE
DESCRIPTORS: ACHING;SHARP

## 2023-01-20 ASSESSMENT — PAIN - FUNCTIONAL ASSESSMENT
PAIN_FUNCTIONAL_ASSESSMENT: PREVENTS OR INTERFERES SOME ACTIVE ACTIVITIES AND ADLS
PAIN_FUNCTIONAL_ASSESSMENT: ACTIVITIES ARE NOT PREVENTED
PAIN_FUNCTIONAL_ASSESSMENT: PREVENTS OR INTERFERES SOME ACTIVE ACTIVITIES AND ADLS

## 2023-01-20 ASSESSMENT — PAIN DESCRIPTION - PAIN TYPE
TYPE: ACUTE PAIN
TYPE: ACUTE PAIN

## 2023-01-20 ASSESSMENT — PAIN DESCRIPTION - FREQUENCY
FREQUENCY: CONTINUOUS
FREQUENCY: INTERMITTENT

## 2023-01-20 NOTE — PROGRESS NOTES
[facility]  SURGICAL INTENSIVE CARE UNIT (SICU)  ATTENDING PHYSICIAN CRITICAL CARE PROGRESS NOTE     Delayed entry I saw the patient on 1/20/23 in the Emergency room     I have examined the patient, reviewed the record,and discussed the case with the APN/  Resident. I have reviewed all relevant labs and imaging data. Please refer to the  APN/ resident's note. I agree with the  assessment and plan with the following corrections/ additions made above     Please refer to the patient's H&P by Dr. Salcido Every    I agree and can verify his physical exam    Labs reviewed unremarkable CBC, BMP, INR.   CT imaging personally viewed and interpreted head CT cervical spine and chest as well as right shoulder x-ray found to have a right 4th rib fracture and pneumothorax, Chest tube in place on repeat Xray no pneumothorax     Admit to tele , general diet , chest tube , daily Xray, pain management tylenol Oxy dilaudid     April Hurley MD

## 2023-01-20 NOTE — ED PROVIDER NOTES
75-year-old male presents to the emergency department after a mechanical fall with right shoulder and chest wall pain patient states he tripped and fell onto his right chest and shoulder and is concerned about possible injury he presented emergency department for further evaluation. The history is provided by the patient. Fall  The accident occurred Less than 1 hour ago. He fell from a height of 1 to 2 ft. He landed on [place]. There was no blood loss. The pain is at a severity of 4/10. The pain is moderate. He was Ambulatory at the scene. Pertinent negatives include no fever, no abdominal pain, no nausea, no vomiting and no headaches. Review of Systems   Constitutional:  Negative for chills and fever. HENT:  Negative for ear pain, sinus pressure and sore throat. Eyes:  Negative for pain, discharge and redness. Respiratory:  Negative for cough, shortness of breath and wheezing. Cardiovascular:  Positive for chest pain. Gastrointestinal:  Negative for abdominal pain, diarrhea, nausea and vomiting. Genitourinary:  Negative for dysuria and frequency. Musculoskeletal:  Negative for arthralgias and back pain. Skin:  Negative for rash and wound. Neurological:  Negative for weakness and headaches. Hematological:  Negative for adenopathy. All other systems reviewed and are negative. Physical Exam  Constitutional:       Appearance: Normal appearance. HENT:      Head: Normocephalic and atraumatic. Nose: Nose normal.   Eyes:      Extraocular Movements: Extraocular movements intact. Pupils: Pupils are equal, round, and reactive to light. Cardiovascular:      Rate and Rhythm: Normal rate and regular rhythm. Pulmonary:      Effort: Pulmonary effort is normal.      Breath sounds: Normal breath sounds. Chest:      Chest wall: Tenderness present. No crepitus. Musculoskeletal:         General: Normal range of motion.       Cervical back: Normal range of motion and neck supple. Skin:     General: Skin is warm. Capillary Refill: Capillary refill takes less than 2 seconds. Neurological:      General: No focal deficit present. Mental Status: He is alert and oriented to person, place, and time.         Chest Tube    Date/Time: 1/19/2023 9:47 PM  Performed by: Mervat Rizvi DO  Authorized by: Mervat Rizvi DO     Consent:     Consent obtained:  Written    Consent given by:  Patient and spouse    Risks, benefits, and alternatives were discussed: yes      Risks discussed:  Bleeding, damage to surrounding structures, pain, infection and incomplete drainage (placement outside pleural space)    Alternatives discussed:  Delayed treatment and no treatment  Universal protocol:     Procedure explained and questions answered to patient or proxy's satisfaction: yes      Relevant documents present and verified: yes      Test results available: yes      Imaging studies available: yes      Required blood products, implants, devices, and special equipment available: yes      Site/side marked: yes      Immediately prior to procedure, a time out was called: yes      Patient identity confirmed:  Verbally with patient and arm band  Pre-procedure details:     Skin preparation:  Povidone-iodine (Chloroprep)    Preparation: Patient was prepped and draped in the usual sterile fashion    Sedation:     Sedation type:  None  Anesthesia:     Anesthesia method:  Local infiltration    Local anesthetic:  Lidocaine 1% w/o epi  Procedure details:     Placement location:  R lateral    Tube size (Fr):  Minicatheter    Ultrasound guidance: no      Tension pneumothorax: no      Tube connected to:  Suction    Drainage characteristics:  Air only    Suture material:  2-0 silk    Dressing:  Petrolatum-impregnated gauze and 4x4 sterile gauze  Post-procedure details:     Post-insertion x-ray findings: tube in good position      Post-insertion x-ray findings comment:  However a residual pneumothorax is noted.    Procedure completion:  Tolerated     MDM  Number of Diagnoses or Management Options  Closed fracture of one rib of right side, initial encounter  Closed head injury, initial encounter  Contusion of right lung, initial encounter  Fall, initial encounter  Strain of neck muscle, initial encounter  Traumatic pneumothorax, initial encounter  Diagnosis management comments: Patient was seen and examined. Imaging revealed a traumatic pneumothorax. Nonrebreather was started at 100% oxygen decision was made to place chest tube. A pigtail catheter was placed see procedure above. Patient was set up for transfer to [facility]. ED Course as of 01/19/23 2334   Thu Jan 19, 2023 2037 Proceed call from radiology patient has a fourth rib fracture that is dislocated with a 15% pneumothorax. There is also noted pulmonary contusion. Plan for transfer to [facility] for evaluation. [CF]   2050 Discussed results with patient. Patient placed on nonrebreather. Will consider chest tube. [CF]      ED Course User Index  [CF] Osmany Rangel, DO     -------------------------------------------- PAST HISTORY ---------------------------------------------  Past Medical History:  has a past medical history of CAD (coronary artery disease), GERD (gastroesophageal reflux disease), and Hyperlipidemia. Past Surgical History:  has a past surgical history that includes Coronary artery bypass graft; Total hip arthroplasty (Left); and joint replacement. Social History:  reports that he quit smoking about 8 years ago. His smoking use included cigarettes. He has a 30.00 pack-year smoking history. He has never used smokeless tobacco. He reports current alcohol use. He reports that he does not use drugs. Family History: family history includes Alcohol Abuse in his father; Cancer in his father; No Known Problems in his mother.      The patients home medications have been reviewed. Allergies: Patient has no known allergies. -------------------------------------------------- RESULTS -------------------------------------------------    Lab  Results for orders placed or performed during the hospital encounter of 01/19/23   CBC with Auto Differential   Result Value Ref Range    WBC 15.3 (H) 4.5 - 11.5 E9/L    RBC 4.79 3.80 - 5.80 E12/L    Hemoglobin 14.9 12.5 - 16.5 g/dL    Hematocrit 45.6 37.0 - 54.0 %    MCV 95.2 80.0 - 99.9 fL    MCH 31.1 26.0 - 35.0 pg    MCHC 32.7 32.0 - 34.5 %    RDW 13.2 11.5 - 15.0 fL    Platelets 878 640 - 999 E9/L    MPV 9.2 7.0 - 12.0 fL    Neutrophils % 87.3 (H) 43.0 - 80.0 %    Immature Granulocytes % 0.5 0.0 - 5.0 %    Lymphocytes % 7.6 (L) 20.0 - 42.0 %    Monocytes % 4.2 2.0 - 12.0 %    Eosinophils % 0.1 0.0 - 6.0 %    Basophils % 0.3 0.0 - 2.0 %    Neutrophils Absolute 13.37 (H) 1.80 - 7.30 E9/L    Immature Granulocytes # 0.07 E9/L    Lymphocytes Absolute 1.17 (L) 1.50 - 4.00 E9/L    Monocytes Absolute 0.65 0.10 - 0.95 E9/L    Eosinophils Absolute 0.02 (L) 0.05 - 0.50 E9/L    Basophils Absolute 0.04 0.00 - 0.20 E9/L   Protime-INR   Result Value Ref Range    Protime 12.5 (H) 9.3 - 12.4 sec    INR 1.1    SPECIMEN REJECTION   Result Value Ref Range    Rejected Test bmp     Reason for Rejection see below        Radiology  XR SHOULDER RIGHT (MIN 2 VIEWS)    Result Date: 1/19/2023  EXAMINATION: THREE XRAY VIEWS OF THE RIGHT SHOULDER; CT OF THE CERVICAL SPINE WITHOUT CONTRAST; CT OF THE CHEST WITHOUT CONTRAST 1/19/2023 7:57 pm COMPARISON: None. HISTORY: ORDERING SYSTEM PROVIDED HISTORY: fall, anterior pain TECHNOLOGIST PROVIDED HISTORY: Reason for exam:->fall, anterior pain FINDINGS: There is no acute displaced fracture in the left shoulder. Degenerative changes are noted in [facility] joint and glenohumeral joint with superior subluxation of the humeral head. There is 4th rib fracture with 10-15% right apical pneumothorax. No acute fractures.  Degenerative changes. Right rib fracture with 15% right apical pneumothorax. CT cervical spine. There is no acute displaced fracture in the cervical spine. The prevertebral soft tissues are normal.  Degenerative changes are identified from C3-T1 with osteophytes and multilevel disc bulges. Impression No acute fractures. Degenerative changes from C3-T1. CT chest. There is borderline cardiac size with diffuse coronary artery calcifications. The great vessels are normal.  Small to moderate mediastinal lymph nodes measuring up to 6 mm are identified. The trachea and major bronchi are patent. There is minimal atelectasis in the lung bases. There is fracture of the right 4th rib laterally with small amount of subcutaneous emphysema and  15% pneumothorax in the right lung. There is a focal area of pulmonary hemorrhage/laceration at the fracture site in the right upper lobe. The liver is of normal architecture. Gallbladder is partially distended with gallstones and sludge. Degenerative changes are identified in the thoracic spine. Impression Displaced fracture of the right 4th rib laterally with  15% right pneumothorax with focal area of pulmonary contusion/laceration at  the fracture site. There is atelectasis in lung bases. There is also small amount of subcutaneous emphysema. Diffuse coronary artery calcification. Critical results were called by Dr. Iram Lassiter to . Dr. Abram Vasquez on 1/19/2023 at 20:35. CT Head W/O Contrast    Result Date: 1/19/2023  EXAMINATION: CT OF THE HEAD WITHOUT CONTRAST  1/19/2023 6:03 pm TECHNIQUE: CT of the head was performed without the administration of intravenous contrast. Automated exposure control, iterative reconstruction, and/or weight based adjustment of the mA/kV was utilized to reduce the radiation dose to as low as reasonably achievable. COMPARISON: None.  HISTORY: ORDERING SYSTEM PROVIDED HISTORY: fall, head strike TECHNOLOGIST PROVIDED HISTORY: Reason for exam:->fall, head strike Has a \"code stroke\" or \"stroke alert\" been called? ->No Decision Support Exception - unselect if not a suspected or confirmed emergency medical condition->Emergency Medical Condition (MA) FINDINGS: BRAIN/VENTRICLES: There is no acute intracranial hemorrhage, mass effect or midline shift. No abnormal extra-axial fluid collection. The gray-white differentiation is maintained without evidence of an acute infarct. There is no evidence of hydrocephalus. ORBITS: The visualized portion of the orbits demonstrate no acute abnormality. SINUSES: The visualized paranasal sinuses and mastoid air cells demonstrate no acute abnormality. SOFT TISSUES/SKULL:  No acute abnormality of the visualized skull or soft tissues. No acute intracranial abnormality. CT CHEST WO CONTRAST    Result Date: 1/19/2023  EXAMINATION: THREE XRAY VIEWS OF THE RIGHT SHOULDER; CT OF THE CERVICAL SPINE WITHOUT CONTRAST; CT OF THE CHEST WITHOUT CONTRAST 1/19/2023 7:57 pm COMPARISON: None. HISTORY: ORDERING SYSTEM PROVIDED HISTORY: fall, anterior pain TECHNOLOGIST PROVIDED HISTORY: Reason for exam:->fall, anterior pain FINDINGS: There is no acute displaced fracture in the left shoulder. Degenerative changes are noted in [facility] joint and glenohumeral joint with superior subluxation of the humeral head. There is 4th rib fracture with 10-15% right apical pneumothorax. No acute fractures. Degenerative changes. Right rib fracture with 15% right apical pneumothorax. CT cervical spine. There is no acute displaced fracture in the cervical spine. The prevertebral soft tissues are normal.  Degenerative changes are identified from C3-T1 with osteophytes and multilevel disc bulges. Impression No acute fractures. Degenerative changes from C3-T1. CT chest. There is borderline cardiac size with diffuse coronary artery calcifications. The great vessels are normal.  Small to moderate mediastinal lymph nodes measuring up to 6 mm are identified.   The trachea and major bronchi are patent. There is minimal atelectasis in the lung bases. There is fracture of the right 4th rib laterally with small amount of subcutaneous emphysema and  15% pneumothorax in the right lung. There is a focal area of pulmonary hemorrhage/laceration at the fracture site in the right upper lobe. The liver is of normal architecture. Gallbladder is partially distended with gallstones and sludge. Degenerative changes are identified in the thoracic spine. Impression Displaced fracture of the right 4th rib laterally with  15% right pneumothorax with focal area of pulmonary contusion/laceration at  the fracture site. There is atelectasis in lung bases. There is also small amount of subcutaneous emphysema. Diffuse coronary artery calcification. Critical results were called by Dr. Zoë Cardoso to . Dr. Allison Carey on 1/19/2023 at 20:35. CT CSpine W/O Contrast    Result Date: 1/19/2023  EXAMINATION: THREE XRAY VIEWS OF THE RIGHT SHOULDER; CT OF THE CERVICAL SPINE WITHOUT CONTRAST; CT OF THE CHEST WITHOUT CONTRAST 1/19/2023 7:57 pm COMPARISON: None. HISTORY: ORDERING SYSTEM PROVIDED HISTORY: fall, anterior pain TECHNOLOGIST PROVIDED HISTORY: Reason for exam:->fall, anterior pain FINDINGS: There is no acute displaced fracture in the left shoulder. Degenerative changes are noted in [facility] joint and glenohumeral joint with superior subluxation of the humeral head. There is 4th rib fracture with 10-15% right apical pneumothorax. No acute fractures. Degenerative changes. Right rib fracture with 15% right apical pneumothorax. CT cervical spine. There is no acute displaced fracture in the cervical spine. The prevertebral soft tissues are normal.  Degenerative changes are identified from C3-T1 with osteophytes and multilevel disc bulges. Impression No acute fractures. Degenerative changes from C3-T1. CT chest. There is borderline cardiac size with diffuse coronary artery calcifications.  The great vessels are normal.  Small to moderate mediastinal lymph nodes measuring up to 6 mm are identified. The trachea and major bronchi are patent. There is minimal atelectasis in the lung bases. There is fracture of the right 4th rib laterally with small amount of subcutaneous emphysema and  15% pneumothorax in the right lung. There is a focal area of pulmonary hemorrhage/laceration at the fracture site in the right upper lobe. The liver is of normal architecture. Gallbladder is partially distended with gallstones and sludge. Degenerative changes are identified in the thoracic spine. Impression Displaced fracture of the right 4th rib laterally with  15% right pneumothorax with focal area of pulmonary contusion/laceration at  the fracture site. There is atelectasis in lung bases. There is also small amount of subcutaneous emphysema. Diffuse coronary artery calcification. Critical results were called by Dr. Wilma Zamora to . Dr. Benitez Diop on 1/19/2023 at 20:35.     ------------------------- NURSING NOTES AND VITALS REVIEWED ---------------------------  Date / Time Roomed:  1/19/2023  7:24 PM  ED Bed Assignment:  12/12    The nursing notes within the ED encounter and vital signs as below have been reviewed. Patient Vitals for the past 24 hrs:   BP Temp Pulse Resp SpO2 Height Weight   01/19/23 2130 132/85 -- 74 19 100 % -- --   01/19/23 2100 -- -- 77 26 -- -- --   01/19/23 2056 134/78 97.6 °F (36.4 °C) 75 20 100 % -- --   01/19/23 1923 122/73 98.1 °F (36.7 °C) 72 18 97 % 6' (1.829 m) 265 lb (120.2 kg)       Oxygen Saturation Interpretation: Normal      ------------------------------------------ PROGRESS NOTES ------------------------------------------  I have spoken with the patient and discussed todays results, in addition to providing specific details for the plan of care and counseling regarding the diagnosis and prognosis. Their questions are answered at this time and they are agreeable with the plan.   I have discussed the risks and benefits of transfer and they wish to proceed with the transfer. --------------------------------- ADDITIONAL PROVIDER NOTES ---------------------------------  Case was discussed with Dr. Uma Abdul at [facility] who was agreed to accept the patient for admission to their for the emergency    Reason for transfer: Traumatic pneumothorax. This patient's ED course included: a personal history and physicial examination, re-evaluation prior to disposition, multiple bedside re-evaluations, IV medications, cardiac monitoring, continuous pulse oximetry, and complex medical decision making and emergency management    This patient has remained hemodynamically stable during their ED course. Please note that the withdrawal or failure to initiate urgent interventions for this patient would likely result in a life threatening deterioration or permanent disability. Accordingly this patient received 30 minutes of critical care time, excluding separately billable procedures. Clinical Impression  1. Closed fracture of one rib of right side, initial encounter    2. Traumatic pneumothorax, initial encounter    3. Contusion of right lung, initial encounter    4. Closed head injury, initial encounter    5. Fall, initial encounter    6. Strain of neck muscle, initial encounter          Disposition  Patient's disposition: Transfer to [facility] ED. Transferred by: [place]. Patient's condition is fair.        Bj Pickens DO  01/19/23 2334

## 2023-01-20 NOTE — PROGRESS NOTES
Trauma Tertiary Survey    Admit Date: 1/19/2023  Hospital day 1    CC: Trauma -  Fall off fence w R ptx s/p CT, and R R4 fx    Alcohol pre-screening:  Men: How many times in the past year have you had 5 or more drinks in a day?  1 or more  How much do you drink on a daily basis? Not daily    Drug Pre-screening:    How many times in the past year have you used a recreational drug or used a prescription medication for non medical reasons? Yes - occasional marijuana    Mood Prescreening:    During the past two weeks, have you been bothered by little interest or pleasure doing things? No  During the past two weeks, have you been bothered by feeling down, depressed or hopeless? No      Scheduled Meds:   sodium chloride flush  10 mL IntraVENous 2 times per day    methocarbamol  1,000 mg Oral 4x Daily    polyethylene glycol  17 g Oral Daily    acetaminophen  650 mg Oral Q6H    bisacodyl  5 mg Oral Daily    losartan  50 mg Oral Daily     Continuous Infusions:   sodium chloride       PRN Meds:sodium chloride flush, sodium chloride, ondansetron **OR** ondansetron, oxyCODONE **OR** oxyCODONE, HYDROmorphone    Subjective:     Pt states that other than his right thorax he has no pain or issues this morning. Objective:   Patient Vitals for the past 8 hrs:   BP Temp Pulse Resp SpO2   01/20/23 0322 126/79 -- 67 20 97 %   01/19/23 2319 117/65 98.2 °F (36.8 °C) 68 22 96 %   01/19/23 2246 (!) 118/56 98 °F (36.7 °C) 75 18 95 %       No intake/output data recorded. No intake/output data recorded.     Past Medical History:   Diagnosis Date    CAD (coronary artery disease)     age 43     GERD (gastroesophageal reflux disease)     Hyperlipidemia        @homemeds@    Radiology:  XR PELVIS (1-2 VIEWS)    (Results Pending)       PHYSICAL EXAM:     Central Nervous System  Loss of consciousness:  No    GCS:    Eye:  4 - Opens eyes on own  Motor:  6 - Follows simple motor commands  Verbal:  5 - Alert and oriented    Neuromuscular blockade: No  Pupil size:  Left 3 mm    Right 3 mm  Pupil reaction: Yes    Wiggles fingers: Left Yes Right Yes  Wiggles toes: Left Yes   Right Yes    Hand grasp:   Left  Present      Right  Present  Plantar flexion: Left  Present      Right   Present    PHYSICAL EXAM  General: No apparent distress, comfortable   HEENT: Trachea midline, no masses, Pupils equal round   Chest: Respiratory effort was normal with no retractions or use of accessory muscles. Small bore chest tube in R thorax w occlusive dressing. No air leak, no output in pleurevac. Cardiovascular: Extremities warm, well perfused,   Abdomen:  Soft and non distended. No tenderness, guarding, rebound, or rigidity   Extremities: Moves all 4 extremeties, No pedal edema     Spine:   Spine Tenderness ROM   Cervical 0/10 Normal   Thoracic 0 /10 Normal   Lumbar 0 /10 Normal     Musculoskeletal:    Joint Tenderness Swelling ROM   Right shoulder Absent absent normal   Left shoulder absent absent normal   Right elbow absent absent normal   Left elbow absent absent normal   Right wrist absent absent normal   Left wrist absent absent normal   Right hand grasp Absent absent normal   Left hand grasp absent absent normal   Right hip absent absent normal   Left hip absent absent normal   Right knee Absent absent normal   Left knee absent absent normal   Right ankle absent absent normal   Left ankle absent absent normal   Right foot Absent absent normal   Left foot absent absent normal       CONSULTS:     PROCEDURES:     INJURIES:      Principal Problem:    Pneumothorax  Resolved Problems:    * No resolved hospital problems.  *        Assessment/Plan:       Neuro:  GCS 15, no acute issues   CV: HR near normal limits, no acute issues  Pulm: on 4LNC, R R4fx, ptx s/p chest tube, maintain CT to suction, pain control, daily CXR  GI: tolerating general diet   Renal: no acute issues   ID: afebrile, no acute issues     Endocrine: no acute issues  MSK: no acute issues    Heme: no acute issues      Bowel regime: dulcolax, glycolax   Pain control/Sedation: tylenol, roxicodone, robaxin, dilaudid  DVT prophylaxis:  SCD's  GI: diet reg  Glucose protocol:   Mouth/Eye care: per patient   Gracia: none    Code status:    Full Code    Patient/Family update:  As available    Disposition:    Admit med/surg    Electronically signed by Akila Panchal DO on 1/20/23 at 6:11 AM EST

## 2023-01-20 NOTE — ED NOTES
Pt alert oriented skin warm dry resp easy lungs cta and equal c/o pain to right side     Yifan Call, MEGHANA  01/20/23 [id number]

## 2023-01-20 NOTE — ED PROVIDER NOTES
HPI:  1/19/23, Time: 10:48 PM RASHAAD Hunter is a 57 y.o. male presenting to the ED for fall with rib fracture, beginning hours ago. The complaint has been persistent, moderate in severity, and worsened by movement of right-sided. Patient presenting here because of fall. Patient reports he was going over some type of railing home he tripped and fell. Patient reports he did not hit his head he reports no neck or back pain he does complain of rib pain. Patient was diagnosed with a pneumothorax and a chest tube placed on the right side at outlying facility. Patient reporting no lower extremity pain or hip pain. ROS:   Pertinent positives and negatives are stated within HPI, all other systems reviewed and are negative.  --------------------------------------------- PAST HISTORY ---------------------------------------------  Past Medical History:  has a past medical history of CAD (coronary artery disease), GERD (gastroesophageal reflux disease), and Hyperlipidemia. Past Surgical History:  has a past surgical history that includes Coronary artery bypass graft; Total hip arthroplasty (Left); and joint replacement. Social History:  reports that he quit smoking about 8 years ago. His smoking use included cigarettes. He has a 30.00 pack-year smoking history. He has never used smokeless tobacco. He reports current alcohol use. He reports that he does not use drugs. Family History: family history includes Alcohol Abuse in his father; Cancer in his father; No Known Problems in his mother. The patients home medications have been reviewed. Allergies: Patient has no known allergies.     ---------------------------------------------------PHYSICAL EXAM--------------------------------------    Constitutional/General: Alert and oriented x3, mild distress  Head: Normocephalic and atraumatic  Eyes: PERRL, EOMI  Mouth: Oropharynx clear, handling secretions, no trismus  Neck: Supple, full ROM, non tender to palpation in the midline, no stridor, no crepitus, no meningeal signs  Pulmonary: Lungs clear anteriorly chest tube on right side  Cardiovascular:  Regular rate. Regular rhythm. No murmurs, gallops, or rubs. 2+ distal pulses  Chest: Right-sided chest wall tenderness  Abdomen: Soft. Non tender. Non distended. +BS. No rebound, guarding, or rigidity. No pulsatile masses appreciated. Musculoskeletal: Moves all extremities x 4. Warm and well perfused, no clubbing, cyanosis, or edema. Capillary refill <3 seconds  Skin: warm and dry. No rashes. Neurologic: GCS 15, CN 2-12 grossly intact, no focal deficits, symmetric strength 5/5 in the upper and lower extremities bilaterally  Psych: Normal Affect    -------------------------------------------------- RESULTS -------------------------------------------------  I have personally reviewed all laboratory and imaging results for this patient. Results are listed below. LABS:  No results found for this visit on 01/19/23. RADIOLOGY:  Interpreted by Radiologist.  No orders to display           ------------------------- NURSING NOTES AND VITALS REVIEWED ---------------------------   The nursing notes within the ED encounter and vital signs as below have been reviewed by myself. /65   Pulse 68   Temp 98.2 °F (36.8 °C)   Resp 22   SpO2 96%   Oxygen Saturation Interpretation: Normal    The patients available past medical records and past encounters were reviewed. ------------------------------ ED COURSE/MEDICAL DECISION MAKING----------------------  Medications - No data to display          Medical Decision Making:      History From: Patient presenting here because of trip and fall. Patient was seen outlying facility had noted rib fracture as well as pneumothorax and a chest tube placed at the facility. Patient reporting no LOC. Patient reporting no abdominal pain or leg pain or hip pain or back pain.   Patient was transferred here for trauma service    CC/HPI Summary, DDx, ED Course, Reassessment, Tests Considered, Patient expectation:   Patient with history of hypertension history of fall. Patient reporting right-sided rib pain. Patient reporting no abdominal pain. Patient reporting no leg pain or hip pain. Patient did have chest tube placed at [facility] for his pneumothorax. Patient here is awake alert oriented x3 heart exam normal lungs are clear anteriorly. Patient able to move all extremities he has no neck or back tenderness. Differential includes rib fractures as well as pneumothorax as well as chest wall contusion. Patient placed on monitor here in the emergency department. Patient was made aware of findings and plan trauma service was consulted for evaluation and admission    Social Determinants affecting Dx or Tx: Patient does not currently smoke he does drink socially    Chronic Conditions: Hypertension history of coronary artery disease    Records Reviewed: I did review patient's old records from prior evaluation at [facility].  he has noted rib fracture as well as 15% pneumothorax on prior CT done at [facility]        Re-Evaluations:             Re-evaluation. Patients symptoms show no change  Patient was ordered fentanyl here for pain. Consultations:             Trauma consult    Critical Care: This patient's ED course included: a personal history and physicial eaxmination    This patient has been closely monitored during their ED course. Counseling: The emergency provider has spoken with the patient and discussed todays results, in addition to providing specific details for the plan of care and counseling regarding the diagnosis and prognosis. Questions are answered at this time and they are agreeable with the plan.       --------------------------------- IMPRESSION AND DISPOSITION ---------------------------------    IMPRESSION  1. Traumatic pneumothorax, initial encounter    2.  Closed fracture of one rib of right side, initial encounter        DISPOSITION  Disposition: Admit to telemetry  Patient condition is stable        NOTE: This report was transcribed using voice recognition software.  Every effort was made to ensure accuracy; however, inadvertent computerized transcription errors may be present          Nj Abel MD  01/19/23 2807

## 2023-01-20 NOTE — PROGRESS NOTES
Occupational Therapy    OT order received. Chart reviewed. Await imaging of pelvis. Will re-attempt when appropriate.      Elizabeth Ards, [address] [facility], OTR/L [id number]

## 2023-01-20 NOTE — ED NOTES
Pt alert oriented skin warm dry resp easy lungs cta abd soft non tender bowel sounds present pt c/o right side pain 7/10     Manjula Miller RN  01/19/23 1616

## 2023-01-20 NOTE — ED PROVIDER NOTES
Shared JEANNIE-ED Attending Visit. CC: [address]  Department of Emergency Medicine   ED  Encounter Note  Admit Date/RoomTime: 2023  7:24 PM  ED Room:     NAME: Leonor Estrada  : 1960  MRN: [id number]     Chief Complaint:  Fall (Mechanical fall, hit head, no LOC, denies thinners) and Shoulder Injury (right)    History of Present Illness       Leonor Estrada is a 57 y.o. old male who presents to the emergency department by private vehicle, for a mechanical fall which occured 3 hour(s) prior to arrival. He reportedly tripped over a dog fence while at home prior to incident with complaints of rig shoulder/ upper chest wall. Since onset the symptoms have been remaining constant. His pain is aggraveated by certain movements or pressure on or palpation of painful area and relieved by nothing, as no treatment has been provided prior to this visit. He denies any headache, loss of consciousness, confusion, dizziness, chest pain, abdominal pain, back pain, numbness, weakness, blurred vision, nausea, vomiting, fever, chills, wounds, or rash. He takes no blood thinning agents. .  ROS   Pertinent positives and negatives are stated within HPI, all other systems reviewed and are negative. Past Medical History:  has a past medical history of CAD (coronary artery disease), GERD (gastroesophageal reflux disease), and Hyperlipidemia. Surgical History:  has a past surgical history that includes Coronary artery bypass graft; Total hip arthroplasty (Left); and joint replacement. Social History:  reports that he quit smoking about 8 years ago. His smoking use included cigarettes. He has a 30.00 pack-year smoking history. He has never used smokeless tobacco. He reports current alcohol use. He reports that he does not use drugs. Family History: family history includes Alcohol Abuse in his father; Cancer in his father; No Known Problems in his mother.      Allergies: Patient has no known allergies. Physical Exam   Oxygen Saturation Interpretation: Normal.        ED Triage Vitals [01/19/23 1923]   BP Temp Temp src Heart Rate Resp SpO2 Height Weight   122/73 98.1 °F (36.7 °C) -- 72 18 97 % 6' (1.829 m) 265 lb (120.2 kg)         Physical Exam  Constitutional:  Alert, development consistent with age. HEENT:  NC/NT. Airway patent. Neck:  No midline or paravertebral tenderness. Normal ROM. Supple. Right sided paraspinal tenderness. Chest:  Symmetrical without visible rash . Right upper anterior chest wall tenderness  Respiratory:  diminished lung sound on the right  CV:  Regular rate and rhythm, normal heart sounds, without pathological murmurs, ectopy, gallops, or rubs. GI:  Abdomen Soft, nontender, good bowel sounds. No firm or pulsatile mass. Pelvis:  Stable, nontender to palpation. Back:  No midline or paravertebral tenderness. No costovertebral tenderness. Extremities: No tenderness or edema noted. Right Shoulder-anterior tenderness upon palpitation. Full range of motion with pain. No wounds or abrasions. No swelling. Negative elbow wrist and hand. Integument:  Normal turgor. Warm, dry, without visible rash, unless noted elsewhere. Lymphatic: no lymphadenopathy noted  Neurological:  Oriented x3, GCS 15. Motor functions intact.      Lab / Imaging Results   (All laboratory and radiology results have been personally reviewed by myself)  Labs:  Results for orders placed or performed during the hospital encounter of 01/19/23   CBC with Auto Differential   Result Value Ref Range    WBC 15.3 (H) 4.5 - 11.5 E9/L    RBC 4.79 3.80 - 5.80 E12/L    Hemoglobin 14.9 12.5 - 16.5 g/dL    Hematocrit 45.6 37.0 - 54.0 %    MCV 95.2 80.0 - 99.9 fL    MCH 31.1 26.0 - 35.0 pg    MCHC 32.7 32.0 - 34.5 %    RDW 13.2 11.5 - 15.0 fL    Platelets 027 828 - 631 E9/L    MPV 9.2 7.0 - 12.0 fL    Neutrophils % 87.3 (H) 43.0 - 80.0 %    Immature Granulocytes % 0.5 0.0 - 5.0 %    Lymphocytes % 7.6 (L) 20.0 - 42.0 %    Monocytes % 4.2 2.0 - 12.0 %    Eosinophils % 0.1 0.0 - 6.0 %    Basophils % 0.3 0.0 - 2.0 %    Neutrophils Absolute 13.37 (H) 1.80 - 7.30 E9/L    Immature Granulocytes # 0.07 E9/L    Lymphocytes Absolute 1.17 (L) 1.50 - 4.00 E9/L    Monocytes Absolute 0.65 0.10 - 0.95 E9/L    Eosinophils Absolute 0.02 (L) 0.05 - 0.50 E9/L    Basophils Absolute 0.04 0.00 - 0.20 E9/L   Protime-INR   Result Value Ref Range    Protime 12.5 (H) 9.3 - 12.4 sec    INR 1.1    SPECIMEN REJECTION   Result Value Ref Range    Rejected Test bmp     Reason for Rejection see below        Imaging: All Radiology results interpreted by Radiologist unless otherwise noted. CT Head W/O Contrast   Final Result   No acute intracranial abnormality. CT CSpine W/O Contrast   Final Result   No acute fractures. Degenerative changes. Right rib fracture with 15% right apical pneumothorax. CT cervical spine. There is no acute displaced fracture in the cervical spine. The prevertebral   soft tissues are normal.  Degenerative changes are identified from C3-T1 with   osteophytes and multilevel disc bulges. Impression      No acute fractures. Degenerative changes from C3-T1. CT chest.      There is borderline cardiac size with diffuse coronary artery calcifications. The great vessels are normal.  Small to moderate mediastinal lymph nodes   measuring up to 6 mm are identified. The trachea and major bronchi are   patent. There is minimal atelectasis in the lung bases. There is fracture   of the right 4th rib laterally with small amount of subcutaneous emphysema   and  15% pneumothorax in the right lung. There is a focal area of pulmonary   hemorrhage/laceration at the fracture site in the right upper lobe. The   liver is of normal architecture. Gallbladder is partially distended with   gallstones and sludge. Degenerative changes are identified in the thoracic   spine.       Impression Displaced fracture of the right 4th rib laterally with  15% right   pneumothorax with focal area of pulmonary contusion/laceration at  the   fracture site. There is atelectasis in lung bases. There is also small   amount of subcutaneous emphysema. Diffuse coronary artery calcification. Critical results were called by Dr. Elissa Gibson to . Dr. Kyra Garcia on   1/19/2023 at 20:35. CT CHEST WO CONTRAST   Final Result   No acute fractures. Degenerative changes. Right rib fracture with 15% right apical pneumothorax. CT cervical spine. There is no acute displaced fracture in the cervical spine. The prevertebral   soft tissues are normal.  Degenerative changes are identified from C3-T1 with   osteophytes and multilevel disc bulges. Impression      No acute fractures. Degenerative changes from C3-T1. CT chest.      There is borderline cardiac size with diffuse coronary artery calcifications. The great vessels are normal.  Small to moderate mediastinal lymph nodes   measuring up to 6 mm are identified. The trachea and major bronchi are   patent. There is minimal atelectasis in the lung bases. There is fracture   of the right 4th rib laterally with small amount of subcutaneous emphysema   and  15% pneumothorax in the right lung. There is a focal area of pulmonary   hemorrhage/laceration at the fracture site in the right upper lobe. The   liver is of normal architecture. Gallbladder is partially distended with   gallstones and sludge. Degenerative changes are identified in the thoracic   spine. Impression      Displaced fracture of the right 4th rib laterally with  15% right   pneumothorax with focal area of pulmonary contusion/laceration at  the   fracture site. There is atelectasis in lung bases. There is also small   amount of subcutaneous emphysema. Diffuse coronary artery calcification. Critical results were called by Dr. Elissa Gibson to . Dr. Bernardo Mckeon on   1/19/2023 at 20:35. XR SHOULDER RIGHT (MIN 2 VIEWS)   Final Result   No acute fractures. Degenerative changes. Right rib fracture with 15% right apical pneumothorax. CT cervical spine. There is no acute displaced fracture in the cervical spine. The prevertebral   soft tissues are normal.  Degenerative changes are identified from C3-T1 with   osteophytes and multilevel disc bulges. Impression      No acute fractures. Degenerative changes from C3-T1. CT chest.      There is borderline cardiac size with diffuse coronary artery calcifications. The great vessels are normal.  Small to moderate mediastinal lymph nodes   measuring up to 6 mm are identified. The trachea and major bronchi are   patent. There is minimal atelectasis in the lung bases. There is fracture   of the right 4th rib laterally with small amount of subcutaneous emphysema   and  15% pneumothorax in the right lung. There is a focal area of pulmonary   hemorrhage/laceration at the fracture site in the right upper lobe. The   liver is of normal architecture. Gallbladder is partially distended with   gallstones and sludge. Degenerative changes are identified in the thoracic   spine. Impression      Displaced fracture of the right 4th rib laterally with  15% right   pneumothorax with focal area of pulmonary contusion/laceration at  the   fracture site. There is atelectasis in lung bases. There is also small   amount of subcutaneous emphysema. Diffuse coronary artery calcification. Critical results were called by Dr. Davin Nazario to . Dr. Bernardo Mckeon on   1/19/2023 at 20:35.          XR CHEST PORTABLE    (Results Pending)     ED Course / Medical Decision Making     Medications   HYDROcodone-acetaminophen (NORCO) 5-325 MG per tablet 1 tablet (1 tablet Oral Given 1/19/23 1945)   fentaNYL (SUBLIMAZE) injection 100 mcg (100 mcg IntraVENous Given 1/19/23 2206)     ED Course as of 01/19/23 2223   u Jan 19, 2023 2037 Proceed call from radiology patient has a fourth rib fracture that is dislocated with a 15% pneumothorax. There is also noted pulmonary contusion. Plan for transfer to [facility] for evaluation. [CF]   2050 Discussed results with patient. Patient placed on nonrebreather. Will consider chest tube. [CF]      ED Course User Index  [CF] Yari Goes, DO     Re-examination:  1/19/23     Time: 2045-reviewed results with patient. Patient moved into room to be placed on nonrebreather and telemetry. Discussed ED to ED transfer for trauma consult. Patient remains stable    Consult(s):  Dr. Bee Walker spoke with ER physician, Dr. Scarlet Hernandez who accepts transfer to [facility] emergency department for trauma. Procedure(s):  Refer to ER attendings procedure note    Medical Decision Making      Patient presents to the ER for tripping over a dog fence and landing on his right shoulder/right upper chest.  He did state he hit his head on cement. Denies any loss of consciousness. Has pain on deep inspiration. .   Social Determinants include   Social Connections: Not on file   Social Determinants : None. Chronic conditions   Past Medical History:   Diagnosis Date    CAD (coronary artery disease)     age 43     GERD (gastroesophageal reflux disease)     Hyperlipidemia    . Physical exam tenderness upon the chest wall on the right upper anterior aspect. Diminished lung sounds on the right. .  Vital signs stable. Differential diagnoses include but not limited to rib fracture versus rib contusion versus lung contusion versus pneumo. Diagnostic studies revealed CT of the head reveals no acute intracranial abnormalities. .  CT of the C-spine reveals no acute fractures. CT of the chest without contrast reveals right rib fracture with 15% right apical pneumothorax. Lung contusion noted which could be hemorrhage/laceration at the fracture site.   X-ray of the right shoulder reveals no acute fracture, degenerative changes. Patient placed on nonrebreather and chest tube was placed. Consults included [facility] emergency department, Dr. Bridgett Sanchez. Results were discussed with patient and his wife at bedside. Patient was given Norco and Fentanyl for their symptoms with mild improvement. Patient requires continued workup and management of their symptoms and will be transferred to [facility] Emergency Department hospital for further evaluation and treatment. Disposition Considerations: This patient's ED course included: a personal history and physicial examination, re-evaluation prior to disposition, multiple bedside re-evaluations, IV medications, cardiac monitoring, continuous pulse oximetry, and complex medical decision making and emergency management  This patient has remained hemodynamically stable and improved during their ED course. Plan of Care/Counseling:  JORGE Valencia CNP reviewed today's visit with the patient and spouse / life partner in addition to providing specific details for the plan of care and counseling regarding the diagnosis and prognosis. Questions are answered at this time and are agreeable with the plan. Assessment      1. Closed fracture of one rib of right side, initial encounter    2. Traumatic pneumothorax, initial encounter    3. Contusion of right lung, initial encounter    4. Closed head injury, initial encounter    5. Fall, initial encounter    6. Strain of neck muscle, initial encounter      Plan   Transfer to [facility] ED  Patient condition is stable    New Medications     New Prescriptions    No medications on file     Electronically signed by JORGE Valencia CNP   DD: 1/19/23  **This report was transcribed using voice recognition software. Every effort was made to ensure accuracy; however, inadvertent computerized transcription errors may be present.   END OF ED PROVIDER NOTE        Duy Caceres Harrie Schaumann, JORGE - CNP  01/19/23 7801

## 2023-01-20 NOTE — H&P
TRAUMA HISTORY & PHYSICAL  Surgical Resident/Advance Practice Nurse  1/20/2023  3:16 AM    PRIMARY SURVEY    CHIEF COMPLAINT:  Trauma consult. Injury occurred on 1/20. Pt was trying to climb over a dog fence and fell, landing on right thorax. Pt initially presented to [facility] and on workup was found to have R R4 fx and small15% ptx on right side. Transferred to [address] [place] [address] for further evaluation and management. AIRWAY:   Airway Normal  EMS ETT Absent  Noisy respirations Absent  Retractions: Absent  Vomiting/bleeding: Absent      BREATHING:    Midaxillary breath sound left:  Normal  Midaxillary breath sound right:  Normal  R chest tube in place, superior lateral R thorax    Cough sound intensity:  fair   FiO2:  O2 4 L NC    SMI 1600 mL.       CIRCULATION:   Femerol pulse intensity: Strong  Palpebral conjunctiva: Red    Vitals:    01/19/23 2319   BP: 117/65   Pulse: 68   Resp: 22   Temp: 98.2 °F (36.8 °C)   SpO2: 96%       Vitals:    01/19/23 2246 01/19/23 2319   BP: (!) 118/56 117/65   Pulse: 75 68   Resp: 18 22   Temp: 98 °F (36.7 °C) 98.2 °F (36.8 °C)   SpO2: 95% 96%        FAST EXAM: Deferred    Central Nervous System    GCS Initial 15 minutes   Eye  Motor  Verbal 4 - Opens eyes on own  6 - Follows simple motor commands  5 - Alert and oriented 4 - Opens eyes on own  6 - Follows simple motor commands  5 - Alert and oriented     Neuromuscular blockade: No  Pupil size:  Left 3 mm    Right 3 mm  Pupil reaction: Yes    Wiggles fingers: Left Yes Right Yes  Wiggles toes: Left Yes   Right Yes    Hand grasp:   Left  Present      Right  Present  Plantar flexion: Left  Present      Right   Present    Loss of consciousness:  No    History Obtained From:  Patient & EMS  Private Medical Doctor: Josh Kiser    Pre-exisiting Medical History:  yes    Conditions: CAD, Gerd, HLD, HTN    Medications: losartan, asa 81, crestor, singulair, flexeril, omeprazole    Allergies: none    Social History:   Tobacco use:  none  Alcohol use:  social drinker  Illicit drug use:  occasional smoked marijuana    Past Surgical History:  L LEENA, CABG    Anticoagulant use: None  Antiplatelet use:   ASA    NSAID use in last 72 hours: yes  Taken PCN in past:  yes  Last food/drink: within the hour  Last tetanus: unk    Family History:   No family history of anesthesia complications    Complaints:   Head:  None  Neck:   None  Chest:   Moderate  Back:   None  Abdomen:   None  Extremities:   None  Comments: Pt has pain where chest tube is located    Review of systems:  All negative unless otherwise noted. SECONDARY SURVEY  Head/scalp: Atraumatic    Face: Atraumatic    Eyes/ears/nose: Atraumatic    Pharynx/mouth: Atraumatic    Neck: Atraumatic     Cervical spine tenderness:   Cervical collar not indicated  Pain:  none  ROM:  Not indicated     Chest wall:  Atraumatic    Heart:  Regular rate & rhythm    Abdomen: Atraumatic. Soft ND  Tenderness:  none    Pelvis: Atraumatic  Tenderness: none    Thoracolumbar spine: Atraumatic  Tenderness:  none    Genitourinary:  Atraumatic. No blood or urine noted    Rectum: Atraumatic. No blood noted. Perineum: Atraumatic. No blood or urine noted. Extremities:   Sensory normal  Motor normal    Distal Pulses  Left arm normal  Right arm normal  Left leg normal  Right leg normal    Capillary refill  Left arm normal  Right arm normal  Left leg normal  Right leg normal    Procedures in ED:      In the event of Emergency Blood Transfusion:  Due to the critical condition of this patient, I request the immediate release of blood products for emergency transfusion secondary to shock. I understand the increased risks incurred by the lack of complete transfusion testing.       Radiology: Chest Xray , Pelvic Xray , CT Head , CT Cervical spine , and CT Chest     Consultations:      Admission/Diagnosis:   Trauma - fall from fence w R R4fx and ptx    Plan of Treatment:  Admit tele  Labs  PXR  Chest tube to [address] discussed with Dr. David Karimi    at 1/20/2023 on 3:16 AM    Electronically signed by Jeanine Schwartz DO on 1/20/2023 at 3:16 AM

## 2023-01-20 NOTE — CARE COORDINATION
1/20. Met with the pt to discuss transition of care. The pt lives with his wife and is independent. His PCP is Dr Lonnie Esqueda. His pharmacy is [facility]. He will return home when medically stable.  Jacey Mendoza RN

## 2023-01-20 NOTE — ED NOTES
Patient monitor alarming when this nurse entered patient room SPO2 86% with good waveform this nurse asked patient if he was supposed to be wearing oxygen patient stated \"I did have some on but took took it off to see how I would do\" no signs and symptoms of distressed noted. This nurse placed patient on 3 lpm via n/c recheck SPO2 95% , section RN and ED attending notified, Bhavesh Chamberlain at this time.      Francine Harrington, WILDER  73/[id number]/02 [id number]

## 2023-01-21 ENCOUNTER — APPOINTMENT (OUTPATIENT)
Dept: GENERAL RADIOLOGY | Age: 63
DRG: 200 | End: 2023-01-21
Payer: COMMERCIAL

## 2023-01-21 LAB
ANION GAP SERPL CALCULATED.3IONS-SCNC: 8 MMOL/L (ref 7–16)
BASOPHILS ABSOLUTE: 0.06 E9/L (ref 0–0.2)
BASOPHILS RELATIVE PERCENT: 0.7 % (ref 0–2)
BUN BLDV-MCNC: 15 MG/DL (ref 6–23)
CALCIUM SERPL-MCNC: 8.9 MG/DL (ref 8.6–10.2)
CHLORIDE BLD-SCNC: 105 MMOL/L (ref 98–107)
CO2: 26 MMOL/L (ref 22–29)
CREAT SERPL-MCNC: 0.8 MG/DL (ref 0.7–1.2)
EOSINOPHILS ABSOLUTE: 0.32 E9/L (ref 0.05–0.5)
EOSINOPHILS RELATIVE PERCENT: 3.9 % (ref 0–6)
GFR SERPL CREATININE-BSD FRML MDRD: >60 ML/MIN/1.73
GLUCOSE BLD-MCNC: 105 MG/DL (ref 74–99)
HCT VFR BLD CALC: 42 % (ref 37–54)
HEMOGLOBIN: 13.7 G/DL (ref 12.5–16.5)
IMMATURE GRANULOCYTES #: 0.04 E9/L
IMMATURE GRANULOCYTES %: 0.5 % (ref 0–5)
LYMPHOCYTES ABSOLUTE: 1.51 E9/L (ref 1.5–4)
LYMPHOCYTES RELATIVE PERCENT: 18.6 % (ref 20–42)
MCH RBC QN AUTO: 30.2 PG (ref 26–35)
MCHC RBC AUTO-ENTMCNC: 32.6 % (ref 32–34.5)
MCV RBC AUTO: 92.7 FL (ref 80–99.9)
MONOCYTES ABSOLUTE: 0.59 E9/L (ref 0.1–0.95)
MONOCYTES RELATIVE PERCENT: 7.2 % (ref 2–12)
NEUTROPHILS ABSOLUTE: 5.62 E9/L (ref 1.8–7.3)
NEUTROPHILS RELATIVE PERCENT: 69.1 % (ref 43–80)
PDW BLD-RTO: 13.5 FL (ref 11.5–15)
PLATELET # BLD: 253 E9/L (ref 130–450)
PMV BLD AUTO: 9.5 FL (ref 7–12)
POTASSIUM REFLEX MAGNESIUM: 4.3 MMOL/L (ref 3.5–5)
RBC # BLD: 4.53 E12/L (ref 3.8–5.8)
SODIUM BLD-SCNC: 139 MMOL/L (ref 132–146)
WBC # BLD: 8.1 E9/L (ref 4.5–11.5)

## 2023-01-21 PROCEDURE — 99231 SBSQ HOSP IP/OBS SF/LOW 25: CPT | Performed by: SURGERY

## 2023-01-21 PROCEDURE — 6370000000 HC RX 637 (ALT 250 FOR IP): Performed by: STUDENT IN AN ORGANIZED HEALTH CARE EDUCATION/TRAINING PROGRAM

## 2023-01-21 PROCEDURE — 1200000000 HC SEMI PRIVATE

## 2023-01-21 PROCEDURE — 80048 BASIC METABOLIC PNL TOTAL CA: CPT

## 2023-01-21 PROCEDURE — 2580000003 HC RX 258

## 2023-01-21 PROCEDURE — 71045 X-RAY EXAM CHEST 1 VIEW: CPT

## 2023-01-21 PROCEDURE — 36415 COLL VENOUS BLD VENIPUNCTURE: CPT

## 2023-01-21 PROCEDURE — 85025 COMPLETE CBC W/AUTO DIFF WBC: CPT

## 2023-01-21 PROCEDURE — 6370000000 HC RX 637 (ALT 250 FOR IP)

## 2023-01-21 PROCEDURE — 6360000002 HC RX W HCPCS

## 2023-01-21 RX ADMIN — PANTOPRAZOLE SODIUM 40 MG: 40 TABLET, DELAYED RELEASE ORAL at 05:34

## 2023-01-21 RX ADMIN — HYDROMORPHONE HYDROCHLORIDE 0.5 MG: 0.5 INJECTION, SOLUTION INTRAMUSCULAR; INTRAVENOUS; SUBCUTANEOUS at 00:50

## 2023-01-21 RX ADMIN — OXYCODONE HYDROCHLORIDE 10 MG: 10 TABLET ORAL at 15:10

## 2023-01-21 RX ADMIN — SODIUM CHLORIDE, PRESERVATIVE FREE 10 ML: 5 INJECTION INTRAVENOUS at 22:00

## 2023-01-21 RX ADMIN — SODIUM CHLORIDE, PRESERVATIVE FREE 10 ML: 5 INJECTION INTRAVENOUS at 08:05

## 2023-01-21 RX ADMIN — METHOCARBAMOL 1000 MG: 500 TABLET ORAL at 17:11

## 2023-01-21 RX ADMIN — ACETAMINOPHEN 650 MG: 325 TABLET ORAL at 10:49

## 2023-01-21 RX ADMIN — OXYCODONE HYDROCHLORIDE 10 MG: 10 TABLET ORAL at 10:49

## 2023-01-21 RX ADMIN — OXYCODONE HYDROCHLORIDE 10 MG: 10 TABLET ORAL at 21:58

## 2023-01-21 RX ADMIN — METHOCARBAMOL 1000 MG: 500 TABLET ORAL at 21:58

## 2023-01-21 RX ADMIN — METHOCARBAMOL 1000 MG: 500 TABLET ORAL at 08:03

## 2023-01-21 RX ADMIN — ACETAMINOPHEN 650 MG: 325 TABLET ORAL at 17:11

## 2023-01-21 RX ADMIN — ACETAMINOPHEN 650 MG: 325 TABLET ORAL at 05:34

## 2023-01-21 RX ADMIN — ROSUVASTATIN 10 MG: 10 TABLET, FILM COATED ORAL at 08:03

## 2023-01-21 RX ADMIN — LOSARTAN POTASSIUM 50 MG: 50 TABLET, FILM COATED ORAL at 08:03

## 2023-01-21 RX ADMIN — BISACODYL 5 MG: 5 TABLET, COATED ORAL at 08:03

## 2023-01-21 RX ADMIN — OXYCODONE HYDROCHLORIDE 10 MG: 10 TABLET ORAL at 05:34

## 2023-01-21 RX ADMIN — MONTELUKAST 10 MG: 10 TABLET, FILM COATED ORAL at 08:03

## 2023-01-21 RX ADMIN — ACETAMINOPHEN 650 MG: 325 TABLET ORAL at 21:58

## 2023-01-21 ASSESSMENT — PAIN SCALES - GENERAL
PAINLEVEL_OUTOF10: 7
PAINLEVEL_OUTOF10: 5
PAINLEVEL_OUTOF10: 7
PAINLEVEL_OUTOF10: 10
PAINLEVEL_OUTOF10: 6
PAINLEVEL_OUTOF10: 7

## 2023-01-21 ASSESSMENT — PAIN - FUNCTIONAL ASSESSMENT: PAIN_FUNCTIONAL_ASSESSMENT: PREVENTS OR INTERFERES SOME ACTIVE ACTIVITIES AND ADLS

## 2023-01-21 ASSESSMENT — PAIN DESCRIPTION - LOCATION
LOCATION: RIB CAGE
LOCATION: RIB CAGE

## 2023-01-21 ASSESSMENT — PAIN DESCRIPTION - ORIENTATION
ORIENTATION: RIGHT
ORIENTATION: RIGHT

## 2023-01-21 ASSESSMENT — PAIN DESCRIPTION - DESCRIPTORS
DESCRIPTORS: ACHING;DISCOMFORT;DULL
DESCRIPTORS: ACHING

## 2023-01-21 NOTE — PROGRESS NOTES
At bedside, patient laid supine with arm lifted over the head. The suture at the site of the chest tube was cut. Next, the chest tube was removed after the patient took a maximal inspiration while simultaneously placing an occlusive dressing consisting of xeroform gauze, 4x4, then secured with silk tape. The patient tolerated the procedure well.     Electronically signed by Darryl Contreras MD on 1/21/2023 at 1:53 PM

## 2023-01-21 NOTE — PROGRESS NOTES
Physical Therapy    Name: Alane Lesches  : 1960  MRN: [id number]  Date of Service: 2023  Room #:  5407/5407-B    PT order received. PT held - awaiting imaging of pelvis. PT will follow up as appropriate.     Daxa Don, PT, DPT  [id number]

## 2023-01-21 NOTE — PROGRESS NOTES
OT consult received and appreciated. Chart reviewed. Will hold evaluation this date awaiting imaging. Will evaluate at a later time. Thank you.  Feroz Robins, OTR/L # [id number]

## 2023-01-21 NOTE — PROGRESS NOTES
Physical Therapy    Physical Therapy order received and chart reviewed. Per conversation and observation, Pt is independent with all functional mobility. Pt with no skilled acute PT needs at this time. Pt instructed to notify any medical team member if functional status were to change. Will discontinue PT services.      Miranda Davis PT, DPT  [id number]

## 2023-01-21 NOTE — PROGRESS NOTES
GENERAL SURGERY  DAILY PROGRESS NOTE  1/21/2023    CHIEF COMPLAINT:  Chief Complaint   Patient presents with    Fall     Tx from [place] for hemo pneumo, pt has R sided chest tube       SUBJECTIVE:  Pt resting comfortably in bed this am. No complaints except pain in R thorax where chest tube is. OBJECTIVE:  /77   Pulse 58   Temp 97.7 °F (36.5 °C) (Temporal)   Resp 20   Ht 6' (1.829 m)   Wt 265 lb (120.2 kg)   SpO2 96%   BMI 35.94 kg/m²     GENERAL:  NAD. A&Ox3. LUNGS:  No increased work of breathing. On RA. CT to  water seal, no air leak. CARDIOVASCULAR: RR  ABDOMEN:  Soft, non-distended, non-tender. No guarding, rigidity, rebound. ASSESSMENT/PLAN:  57 y.o. male w R R4fx and CT for ptx    Morning CXR looks good will remove chest tube and pending post pull xray possible DC   If looks ok, pull CT  Repeat CXR 4hrs after  Possible d/c today    Maritza Linn DO  Surgery Resident PGY-1  1/21/2023  9:13 AM     [facility]  SURGICAL INTENSIVE CARE UNIT (SICU)  ATTENDING PHYSICIAN CRITICAL CARE PROGRESS NOTE     I have examined the patient, reviewed the record,and discussed the case with the APN/  Resident. I have reviewed all relevant labs and imaging data. Please refer to the  APN/ resident's note.  I agree with the  assessment and plan with the following corrections/ additions made above       Peggy Eng MD

## 2023-01-21 NOTE — PROGRESS NOTES
PCP is Nafisa Slade MD  Office notified of admission.       Electronically signed by Arian Leal RN MSN APRN-NP [facility] NP  [facility] 1/21/2023 12:02 PM

## 2023-01-22 ENCOUNTER — APPOINTMENT (OUTPATIENT)
Dept: GENERAL RADIOLOGY | Age: 63
DRG: 200 | End: 2023-01-22
Payer: COMMERCIAL

## 2023-01-22 VITALS
RESPIRATION RATE: 18 BRPM | WEIGHT: 265 LBS | HEART RATE: 57 BPM | SYSTOLIC BLOOD PRESSURE: 118 MMHG | OXYGEN SATURATION: 91 % | HEIGHT: 72 IN | DIASTOLIC BLOOD PRESSURE: 71 MMHG | BODY MASS INDEX: 35.89 KG/M2 | TEMPERATURE: 97.5 F

## 2023-01-22 LAB
ANION GAP SERPL CALCULATED.3IONS-SCNC: 11 MMOL/L (ref 7–16)
BASOPHILS ABSOLUTE: 0.08 E9/L (ref 0–0.2)
BASOPHILS RELATIVE PERCENT: 1 % (ref 0–2)
BUN BLDV-MCNC: 15 MG/DL (ref 6–23)
CALCIUM SERPL-MCNC: 9.1 MG/DL (ref 8.6–10.2)
CHLORIDE BLD-SCNC: 104 MMOL/L (ref 98–107)
CO2: 24 MMOL/L (ref 22–29)
CREAT SERPL-MCNC: 0.8 MG/DL (ref 0.7–1.2)
EOSINOPHILS ABSOLUTE: 0.38 E9/L (ref 0.05–0.5)
EOSINOPHILS RELATIVE PERCENT: 4.6 % (ref 0–6)
GFR SERPL CREATININE-BSD FRML MDRD: >60 ML/MIN/1.73
GLUCOSE BLD-MCNC: 101 MG/DL (ref 74–99)
HCT VFR BLD CALC: 43.8 % (ref 37–54)
HEMOGLOBIN: 14.4 G/DL (ref 12.5–16.5)
IMMATURE GRANULOCYTES #: 0.03 E9/L
IMMATURE GRANULOCYTES %: 0.4 % (ref 0–5)
LYMPHOCYTES ABSOLUTE: 2.22 E9/L (ref 1.5–4)
LYMPHOCYTES RELATIVE PERCENT: 26.6 % (ref 20–42)
MCH RBC QN AUTO: 31.3 PG (ref 26–35)
MCHC RBC AUTO-ENTMCNC: 32.9 % (ref 32–34.5)
MCV RBC AUTO: 95.2 FL (ref 80–99.9)
MONOCYTES ABSOLUTE: 0.55 E9/L (ref 0.1–0.95)
MONOCYTES RELATIVE PERCENT: 6.6 % (ref 2–12)
NEUTROPHILS ABSOLUTE: 5.09 E9/L (ref 1.8–7.3)
NEUTROPHILS RELATIVE PERCENT: 60.8 % (ref 43–80)
PDW BLD-RTO: 13.4 FL (ref 11.5–15)
PLATELET # BLD: 265 E9/L (ref 130–450)
PMV BLD AUTO: 9.4 FL (ref 7–12)
POTASSIUM REFLEX MAGNESIUM: 4.1 MMOL/L (ref 3.5–5)
RBC # BLD: 4.6 E12/L (ref 3.8–5.8)
SODIUM BLD-SCNC: 139 MMOL/L (ref 132–146)
WBC # BLD: 8.4 E9/L (ref 4.5–11.5)

## 2023-01-22 PROCEDURE — 80048 BASIC METABOLIC PNL TOTAL CA: CPT

## 2023-01-22 PROCEDURE — 85025 COMPLETE CBC W/AUTO DIFF WBC: CPT

## 2023-01-22 PROCEDURE — 71045 X-RAY EXAM CHEST 1 VIEW: CPT

## 2023-01-22 PROCEDURE — 6370000000 HC RX 637 (ALT 250 FOR IP)

## 2023-01-22 PROCEDURE — 99238 HOSP IP/OBS DSCHRG MGMT 30/<: CPT | Performed by: SURGERY

## 2023-01-22 PROCEDURE — 6370000000 HC RX 637 (ALT 250 FOR IP): Performed by: STUDENT IN AN ORGANIZED HEALTH CARE EDUCATION/TRAINING PROGRAM

## 2023-01-22 PROCEDURE — 36415 COLL VENOUS BLD VENIPUNCTURE: CPT

## 2023-01-22 RX ORDER — OXYCODONE HYDROCHLORIDE AND ACETAMINOPHEN 5; 325 MG/1; MG/1
1 TABLET ORAL EVERY 6 HOURS PRN
Qty: 28 TABLET | Refills: 0 | Status: SHIPPED | OUTPATIENT
Start: 2023-01-22 | End: 2023-01-29

## 2023-01-22 RX ORDER — METHOCARBAMOL 1000 MG/1
1000 TABLET, COATED ORAL 4 TIMES DAILY
Qty: 40 TABLET | Refills: 0 | Status: SHIPPED | OUTPATIENT
Start: 2023-01-22 | End: 2023-02-01

## 2023-01-22 RX ADMIN — MONTELUKAST 10 MG: 10 TABLET, FILM COATED ORAL at 08:58

## 2023-01-22 RX ADMIN — OXYCODONE HYDROCHLORIDE 10 MG: 10 TABLET ORAL at 08:58

## 2023-01-22 RX ADMIN — PANTOPRAZOLE SODIUM 40 MG: 40 TABLET, DELAYED RELEASE ORAL at 05:10

## 2023-01-22 RX ADMIN — ROSUVASTATIN 10 MG: 10 TABLET, FILM COATED ORAL at 08:57

## 2023-01-22 RX ADMIN — METHOCARBAMOL 1000 MG: 500 TABLET ORAL at 08:58

## 2023-01-22 RX ADMIN — OXYCODONE HYDROCHLORIDE 10 MG: 10 TABLET ORAL at 03:17

## 2023-01-22 RX ADMIN — ACETAMINOPHEN 650 MG: 325 TABLET ORAL at 03:17

## 2023-01-22 RX ADMIN — BISACODYL 5 MG: 5 TABLET, COATED ORAL at 08:57

## 2023-01-22 RX ADMIN — LOSARTAN POTASSIUM 50 MG: 50 TABLET, FILM COATED ORAL at 08:58

## 2023-01-22 ASSESSMENT — PAIN DESCRIPTION - DESCRIPTORS: DESCRIPTORS: SHARP;SHOOTING;SORE

## 2023-01-22 ASSESSMENT — PAIN SCALES - GENERAL
PAINLEVEL_OUTOF10: 7
PAINLEVEL_OUTOF10: 5

## 2023-01-22 ASSESSMENT — PAIN DESCRIPTION - ORIENTATION: ORIENTATION: RIGHT

## 2023-01-22 ASSESSMENT — PAIN DESCRIPTION - LOCATION: LOCATION: RIB CAGE

## 2023-01-22 ASSESSMENT — PAIN - FUNCTIONAL ASSESSMENT: PAIN_FUNCTIONAL_ASSESSMENT: PREVENTS OR INTERFERES SOME ACTIVE ACTIVITIES AND ADLS

## 2023-01-22 NOTE — DISCHARGE INSTRUCTIONS
If taking home Flexeril, do not take Robaxin as prescribed  Do not drive a vehicle or operate heavy machinery on narcotics  Okay to remove your dressing tomorrow and apply second dressing, okay for topical ointments. TRAUMA SERVICES DISCHARGE INSTRUCTIONS    Call [phone], option 2, for any questions/concerns and for follow-up appointment in 1 week(s). Please follow the instructions checked below:  Please follow-up with your primary care provider. ACTIVITY INSTRUCTIONS  Increase activity as tolerated  No heavy lifting or strenuous activity  Take your incentive spirometer home and use 4-6 times/day   []  No driving until cleared by ***    WOUND/DRESSING INSTRUCTIONS:  You may shower. No sitting in bath tub, hot tub or swimming until cleared by physician. Ice to areas of pain for first 24 hours. Heat to areas of pain after that. Wash areas of lacerations/abrasions with soap & water. Rinse well. Pat dry with clean towel. Apply thin layer of Bacitracin, Neosporin, or triple antibiotic cream to affected area 2-3 times per day. Keep wounds clean and dry. [x]  Sutures/Staples are to be removed in 1 week(s). MEDICATION INSTRUCTIONS  Take medication as prescribed. When taking pain medications, you may experience dizziness or drowsiness. Do not drink alcohol or drive when taking these medications. You may experience constipation while taking pain medication. You may take over the counter stool softeners such as docusate (Colace), sennosides S (Senokot-S), or Miralax. [x]  You may take Ibuprofen (over the counter) as directed for mild pain. --You may take up to 800mg every 8 hours for pain, please take with food or milk. [x]  You may take acetaminophen (Tylenol) products. Do NOT take more than 4000mg of Tylenol in 24h. OPIOID MEDICATION INSTRUCTIONS  Read the medication guide that is included with your prescription. Take your medication exactly as prescribed.   Store medication away from children and in a safe place. Do NOT share your medication with others. Do NOT take medication unless it is prescribed for you. Do NOT drink alcohol while taking opioids (I.e., Norco, Percocet, Oxycodone, etc). Discuss with the Trauma Clinic staff if the dose of medication you are taking does not control your pain and any side effects that you may be having. CALL 911 OR YOUR LOCAL EMERGENCY SERVICE:  --If you take too much medication  --If you have trouble breathing or shortness of breath  --A child has taken this medication. WORK:  You may not return to work until you receive follow-up with the Trauma Clinic or clearance by all consultants. Call the trauma clinic for any of the following or for questions/concerns;  --fever over 101F  --redness, swelling, hardness or warmth at the wound site(s). --Unrelieved nausea/vomiting  --Foul smelling or cloudy drainage at the wound site(s)  --Unrelieved pain or increase in pain  --Increase in shortness of breath    Follow-up:  Trauma Clinic: [phone] option 2  [address]    Your ribs are curved bones in your chest that protect inner structures like your lungs and heart. The ribs expand and contract when you breathe. Pain can result making it hard to cough or breathe deeply. The difficulty increases if several ribs are broken on both sides. You are likely to heal in 6 to 8 weeks, but may take longer if you are elderly. Most rib fractures heal on their own with no lasting effects. Treatment:   Take the medications given to you as prescribed. Your pain may not go completely away after discharge from the hospital, but we want your pain tolerable so you can take deep breaths. As your pain becomes controlled you may switch to taking over-the-counter medications such as Tylenol and or Ibuprofen as directed.   Tylenol (acetaminophen) 1000mg every 6 hours or you may take 650mg every 4 hours. Ibuprofen up to 800mg every 8 hours. (Please take with food or milk). Over the counter creams and patches such as Salon Pas, [employer], Aspercream, can be useful as well. You were likely given a breathing device called an incentive spirometer while in the hospital to practice deep breathing. It is advised to continue this several times a day while at home to prevent pneumonia. Prevent Complications:  Try to take 5-10 deep breaths every hour while awake. Use the incentive spirometer often. Set goals of deeper breathing every couple of days until reaching normal adult level of about 2500 ml on the printed scale. Activity:  Avoid further chest injury. Plan quiet activity for the first few days. Do not stay in bed. Walk around and move. No rough activities with family, friends or pets. No sports, jumping, jogging or gymnastics. Ask your doctor or the trauma clinic when you will be able to resume these activities. Symptoms to Report:  Call your doctor right away if you notice any of these symptoms:   Increased chest pain  Shortness of breath  Fever  Coughing up blood    Call 911 immediately if these symptoms are severe.     Follow-up:  Trauma Clinic: [phone] option [address]  [place], [phone] Jhonathan Kumar

## 2023-01-22 NOTE — PROGRESS NOTES
Patient to be discharged home today . Medications and discharge instructions reviewed with patient and wife. No questions or concerns. IV removed per policy . Transport to take patient to car , later this afternoon.

## 2023-01-22 NOTE — DISCHARGE SUMMARY
Physician Discharge Summary     Patient ID:  Kaitlynn James  [id number]  82 y.o.  1960    Admit date: 1/19/2023    Discharge date and time: No discharge date for patient encounter. Admitting Physician: Soy Faulkner MD     Admission Diagnoses: Pneumothorax [J93.9]  Traumatic pneumothorax, initial encounter [S27. 0XXA]  Closed fracture of one rib of right side, initial encounter [S22.31XA]    Discharge Diagnoses: Principal Problem:    Pneumothorax  Resolved Problems:    * No resolved hospital problems. *      Admission Condition: stable    Discharged Condition: stable    Indication for Admission: Rib fracture and pneumothorax    Hospital Course/Procedures/Operation/treatments:   Injury occurred on 1/20. Pt was trying to climb over a dog fence and fell, landing on right thorax. Pt initially presented to [facility] and on workup was found to have R R4 fx and small15% ptx on right side. Transferred to [facility] for further evaluation and management. 1/20: Chest tube placement on the right, confirmed to be good with chest x-ray, CT head cervical spine chest  1/21: PT OT determined okay for discharge since no functional needs, chest tube removed as no residual pneumothorax found pain well controlled  1/22: Tolerating regular diet diet, feeling well, okay for discharge      Consults:   IP CONSULT TO TRAUMA SURGERY  IP CONSULT TO SOCIAL WORK    Significant Diagnostic Studies:   XR SHOULDER RIGHT (MIN 2 VIEWS)    Result Date: 1/19/2023  EXAMINATION: THREE XRAY VIEWS OF THE RIGHT SHOULDER; CT OF THE CERVICAL SPINE WITHOUT CONTRAST; CT OF THE CHEST WITHOUT CONTRAST 1/19/2023 7:57 pm COMPARISON: None. HISTORY: ORDERING SYSTEM PROVIDED HISTORY: fall, anterior pain TECHNOLOGIST PROVIDED HISTORY: Reason for exam:->fall, anterior pain FINDINGS: There is no acute displaced fracture in the left shoulder. Degenerative changes are noted in [facility] joint and glenohumeral joint with superior subluxation of the humeral head.   There is 4th rib fracture with 10-15% right apical pneumothorax. No acute fractures. Degenerative changes. Right rib fracture with 15% right apical pneumothorax. CT cervical spine. There is no acute displaced fracture in the cervical spine. The prevertebral soft tissues are normal.  Degenerative changes are identified from C3-T1 with osteophytes and multilevel disc bulges. Impression No acute fractures. Degenerative changes from C3-T1. CT chest. There is borderline cardiac size with diffuse coronary artery calcifications. The great vessels are normal.  Small to moderate mediastinal lymph nodes measuring up to 6 mm are identified. The trachea and major bronchi are patent. There is minimal atelectasis in the lung bases. There is fracture of the right 4th rib laterally with small amount of subcutaneous emphysema and  15% pneumothorax in the right lung. There is a focal area of pulmonary hemorrhage/laceration at the fracture site in the right upper lobe. The liver is of normal architecture. Gallbladder is partially distended with gallstones and sludge. Degenerative changes are identified in the thoracic spine. Impression Displaced fracture of the right 4th rib laterally with  15% right pneumothorax with focal area of pulmonary contusion/laceration at  the fracture site. There is atelectasis in lung bases. There is also small amount of subcutaneous emphysema. Diffuse coronary artery calcification. Critical results were called by Dr. Shahla Conway to . Dr. Mark Wilde on 1/19/2023 at 20:35. CT Head W/O Contrast    Result Date: 1/19/2023  EXAMINATION: CT OF THE HEAD WITHOUT CONTRAST  1/19/2023 6:03 pm TECHNIQUE: CT of the head was performed without the administration of intravenous contrast. Automated exposure control, iterative reconstruction, and/or weight based adjustment of the mA/kV was utilized to reduce the radiation dose to as low as reasonably achievable. COMPARISON: None.  HISTORY: ORDERING SYSTEM PROVIDED HISTORY: fall, head strike TECHNOLOGIST PROVIDED HISTORY: Reason for exam:->fall, head strike Has a \"code stroke\" or \"stroke alert\" been called? ->No Decision Support Exception - unselect if not a suspected or confirmed emergency medical condition->Emergency Medical Condition (MA) FINDINGS: BRAIN/VENTRICLES: There is no acute intracranial hemorrhage, mass effect or midline shift. No abnormal extra-axial fluid collection. The gray-white differentiation is maintained without evidence of an acute infarct. There is no evidence of hydrocephalus. ORBITS: The visualized portion of the orbits demonstrate no acute abnormality. SINUSES: The visualized paranasal sinuses and mastoid air cells demonstrate no acute abnormality. SOFT TISSUES/SKULL:  No acute abnormality of the visualized skull or soft tissues. No acute intracranial abnormality. CT CHEST WO CONTRAST    Result Date: 1/19/2023  EXAMINATION: THREE XRAY VIEWS OF THE RIGHT SHOULDER; CT OF THE CERVICAL SPINE WITHOUT CONTRAST; CT OF THE CHEST WITHOUT CONTRAST 1/19/2023 7:57 pm COMPARISON: None. HISTORY: ORDERING SYSTEM PROVIDED HISTORY: fall, anterior pain TECHNOLOGIST PROVIDED HISTORY: Reason for exam:->fall, anterior pain FINDINGS: There is no acute displaced fracture in the left shoulder. Degenerative changes are noted in [facility] joint and glenohumeral joint with superior subluxation of the humeral head. There is 4th rib fracture with 10-15% right apical pneumothorax. No acute fractures. Degenerative changes. Right rib fracture with 15% right apical pneumothorax. CT cervical spine. There is no acute displaced fracture in the cervical spine. The prevertebral soft tissues are normal.  Degenerative changes are identified from C3-T1 with osteophytes and multilevel disc bulges. Impression No acute fractures. Degenerative changes from C3-T1. CT chest. There is borderline cardiac size with diffuse coronary artery calcifications.  The great vessels are normal.  Small to moderate mediastinal lymph nodes measuring up to 6 mm are identified. The trachea and major bronchi are patent. There is minimal atelectasis in the lung bases. There is fracture of the right 4th rib laterally with small amount of subcutaneous emphysema and  15% pneumothorax in the right lung. There is a focal area of pulmonary hemorrhage/laceration at the fracture site in the right upper lobe. The liver is of normal architecture. Gallbladder is partially distended with gallstones and sludge. Degenerative changes are identified in the thoracic spine. Impression Displaced fracture of the right 4th rib laterally with  15% right pneumothorax with focal area of pulmonary contusion/laceration at  the fracture site. There is atelectasis in lung bases. There is also small amount of subcutaneous emphysema. Diffuse coronary artery calcification. Critical results were called by Dr. Daryl Hess to . Dr. Colin Nguyễn on 1/19/2023 at 20:35. CT CSpine W/O Contrast    Result Date: 1/19/2023  EXAMINATION: THREE XRAY VIEWS OF THE RIGHT SHOULDER; CT OF THE CERVICAL SPINE WITHOUT CONTRAST; CT OF THE CHEST WITHOUT CONTRAST 1/19/2023 7:57 pm COMPARISON: None. HISTORY: ORDERING SYSTEM PROVIDED HISTORY: fall, anterior pain TECHNOLOGIST PROVIDED HISTORY: Reason for exam:->fall, anterior pain FINDINGS: There is no acute displaced fracture in the left shoulder. Degenerative changes are noted in [facility] [place] joint and glenohumeral joint with superior subluxation of the humeral head. There is 4th rib fracture with 10-15% right apical pneumothorax. No acute fractures. Degenerative changes. Right rib fracture with 15% right apical pneumothorax. CT cervical spine. There is no acute displaced fracture in the cervical spine. The prevertebral soft tissues are normal.  Degenerative changes are identified from C3-T1 with osteophytes and multilevel disc bulges. Impression No acute fractures. Degenerative changes from C3-T1.  CT chest. There is borderline cardiac size with diffuse coronary artery calcifications. The great vessels are normal.  Small to moderate mediastinal lymph nodes measuring up to 6 mm are identified. The trachea and major bronchi are patent. There is minimal atelectasis in the lung bases. There is fracture of the right 4th rib laterally with small amount of subcutaneous emphysema and  15% pneumothorax in the right lung. There is a focal area of pulmonary hemorrhage/laceration at the fracture site in the right upper lobe. The liver is of normal architecture. Gallbladder is partially distended with gallstones and sludge. Degenerative changes are identified in the thoracic spine. Impression Displaced fracture of the right 4th rib laterally with  15% right pneumothorax with focal area of pulmonary contusion/laceration at  the fracture site. There is atelectasis in lung bases. There is also small amount of subcutaneous emphysema. Diffuse coronary artery calcification. Critical results were called by Dr. Oliverio Goldberg to . Dr. Veena Sutton on 1/19/2023 at 20:35. XR CHEST PORTABLE    Result Date: 1/20/2023  EXAMINATION: ONE XRAY VIEW OF THE CHEST 1/20/2023 12:41 pm COMPARISON: 19 January 2023 HISTORY: ORDERING SYSTEM PROVIDED HISTORY: Pneumothorax, chest tube TECHNOLOGIST PROVIDED HISTORY: Reason for exam:->Pneumothorax, chest tube What reading provider will be dictating this exam?->CRC FINDINGS: Single AP upright portable chest demonstrates evidence of previous cardiac surgery. There is a suboptimal inspiratory effort with mild increased markings and small pleural effusions. There is a large bore pigtail catheter in the right costophrenic angle without evidence of residual pneumothorax. Resolution of right apical pneumothorax.      XR CHEST PORTABLE    Result Date: 1/19/2023  EXAMINATION: ONE XRAY VIEW OF THE CHEST 1/19/2023 8:04 pm COMPARISON: CT chest 01/19/2023 HISTORY: ORDERING SYSTEM PROVIDED HISTORY: Chest tube placement TECHNOLOGIST PROVIDED HISTORY: Reason for exam:->Chest tube placement FINDINGS: A right-sided pigtail chest tube is identified. There is a small residual right apical pneumothorax. Decreased lung volumes are seen with basilar atelectasis. Heart is prominent size, but partially obscured. Residual small right apical pneumothorax status post pigtail chest tube placement. Discharge Exam:  GENERAL:  NAD. A&Ox3. LUNGS:  No increased work of breathing. On RA. Bandage without strikethrough. CARDIOVASCULAR: RR  ABDOMEN:  Soft, non-distended, non-tender. No guarding, rigidity, rebound. Disposition: home    In process/preliminary results:  Outstanding Order Results       No orders found from 12/21/2022 to 1/20/2023. Patient Instructions:   Current Discharge Medication List             Details   bisacodyl (DULCOLAX) 5 MG EC tablet Take 1 tablet by mouth daily  Qty: 10 tablet, Refills: 0      methocarbamol 1000 MG TABS Take 1,000 mg by mouth 4 times daily for 10 days  Qty: 40 tablet, Refills: 0      oxyCODONE-acetaminophen (PERCOCET) 5-325 MG per tablet Take 1 tablet by mouth every 6 hours as needed for Pain for up to 7 days. Intended supply: 3 days.  Take lowest dose possible to manage pain Max Daily Amount: 4 tablets  Qty: 28 tablet, Refills: 0    Comments: Reduce doses taken as pain becomes manageable  Associated Diagnoses: Closed fracture of one rib of right side, initial encounter                Details   losartan (COZAAR) 50 MG tablet Take 1 tablet by mouth daily  Qty: 90 tablet, Refills: 3    Associated Diagnoses: Hypertension, unspecified type      aspirin 81 MG chewable tablet Take 81 mg by mouth daily      rosuvastatin (CRESTOR) 10 MG tablet Take 1 tablet by mouth daily  Qty: 90 tablet, Refills: 2    Associated Diagnoses: Pure hypercholesterolemia; Coronary artery disease involving coronary bypass graft of native heart without angina pectoris      montelukast (SINGULAIR) 10 MG tablet TAKE 1 TABLET BY MOUTH EVERY DAY  Qty: 90 tablet, Refills: 1    Associated Diagnoses: Wheezing; Seasonal allergies      cyclobenzaprine (FLEXERIL) 10 mg tablet TAKE 1 TABLET BY MOUTH THREE TIMES A DAY AS NEEDED FOR MUSCLE SPASMS  Qty: 90 tablet, Refills: 1    Associated Diagnoses: Lumbar strain, sequela      betamethasone dipropionate 0.05 % cream APPLY TOPICALLY 2 TIMES DAILY TP AFFECTED AREA  Qty: 45 g, Refills: 5    Associated Diagnoses: Psoriasis      albuterol sulfate  (90 Base) MCG/ACT inhaler Inhale 2 puffs into the lungs 4 times daily as needed for Wheezing  Qty: 1 Inhaler, Refills: 2    Associated Diagnoses: Wheezing; Seasonal allergies; History of smoking      omeprazole (PRILOSEC) 20 MG delayed release capsule Take 1 capsule by mouth daily  Qty: 90 capsule, Refills: 1    Associated Diagnoses: Gastroesophageal reflux disease without esophagitis             Broken Rib: Care Instructions  Overview     A broken rib is a crack or break in one of the bones of the rib cage. Breathing can be very painful because the muscles used for breathing pull on the rib. In most cases, a broken rib will heal on its own. You can take pain medicine while the rib mends. Pain relief allows you to take deep breaths. In the past, doctors recommended taping or wrapping broken ribs. This is no longer done because taping makes it hard for you to take deep breaths. Taking deep breaths may help prevent pneumonia or a partial collapse of a lung. Your rib will heal in about 6 weeks. You heal best when you take good care of yourself. Eat a variety of healthy foods, and don't smoke. Follow-up care is a key part of your treatment and safety. Be sure to make and go to all appointments, and call your doctor if you are having problems. It's also a good idea to know your test results and keep a list of the medicines you take. How can you care for yourself at home? Be safe with medicines. Read and follow all instructions on the label.   If the doctor gave you a prescription medicine for pain, take it as prescribed. If you are not taking a prescription pain medicine, ask your doctor if you can take an over-the-counter medicine. Even if it hurts, try to cough or take the deepest breath you can at least once every hour. This will get air deeply into your lungs. This may reduce your chance of getting pneumonia or a partial collapse of a lung. Hold a pillow against your chest to make this less painful. Put ice or a cold pack on the area for 10 to 20 minutes at a time. Put a thin cloth between the ice and your skin. When should you call for help? Call 911 anytime you think you may need emergency care. For example, call if:    You have severe trouble breathing. Call your doctor now or seek immediate medical care if:    You have some trouble breathing. You have a fever. You have a new or worse cough. Watch closely for changes in your health, and be sure to contact your doctor if:    You have pain even after taking your medicine. You do not get better as expected.        Follow up:   Donna Bhakta, [address] [facility] [id number]    Call in 1 week(s)         Signed:  Davion Castañeda DO  1/22/2023  10:12 AM

## 2023-01-22 NOTE — PROGRESS NOTES
Patient doing well. Pain controlled. Dressing changed.  Plan DC Refer to DC summary     Isha Dillon MD

## 2023-01-31 ENCOUNTER — OFFICE VISIT (OUTPATIENT)
Dept: SURGERY | Age: 63
End: 2023-01-31
Payer: COMMERCIAL

## 2023-01-31 VITALS
OXYGEN SATURATION: 95 % | TEMPERATURE: 97.4 F | DIASTOLIC BLOOD PRESSURE: 73 MMHG | BODY MASS INDEX: 36.62 KG/M2 | HEART RATE: 69 BPM | SYSTOLIC BLOOD PRESSURE: 125 MMHG | WEIGHT: 270 LBS

## 2023-01-31 DIAGNOSIS — S22.31XD CLOSED FRACTURE OF ONE RIB OF RIGHT SIDE WITH ROUTINE HEALING, SUBSEQUENT ENCOUNTER: Primary | ICD-10-CM

## 2023-01-31 PROCEDURE — G8427 DOCREV CUR MEDS BY ELIG CLIN: HCPCS | Performed by: CLINICAL NURSE SPECIALIST

## 2023-01-31 PROCEDURE — 1036F TOBACCO NON-USER: CPT | Performed by: CLINICAL NURSE SPECIALIST

## 2023-01-31 PROCEDURE — 3017F COLORECTAL CA SCREEN DOC REV: CPT | Performed by: CLINICAL NURSE SPECIALIST

## 2023-01-31 PROCEDURE — 99212 OFFICE O/P EST SF 10 MIN: CPT | Performed by: CLINICAL NURSE SPECIALIST

## 2023-01-31 PROCEDURE — G8484 FLU IMMUNIZE NO ADMIN: HCPCS | Performed by: CLINICAL NURSE SPECIALIST

## 2023-01-31 PROCEDURE — 3078F DIAST BP <80 MM HG: CPT | Performed by: CLINICAL NURSE SPECIALIST

## 2023-01-31 PROCEDURE — G8417 CALC BMI ABV UP PARAM F/U: HCPCS | Performed by: CLINICAL NURSE SPECIALIST

## 2023-01-31 PROCEDURE — 3074F SYST BP LT 130 MM HG: CPT | Performed by: CLINICAL NURSE SPECIALIST

## 2023-01-31 PROCEDURE — 1111F DSCHRG MED/CURRENT MED MERGE: CPT | Performed by: CLINICAL NURSE SPECIALIST

## 2023-01-31 RX ORDER — OXYCODONE HYDROCHLORIDE 5 MG/1
5 TABLET ORAL EVERY 6 HOURS PRN
Qty: 12 TABLET | Refills: 0 | Status: SHIPPED | OUTPATIENT
Start: 2023-01-31 | End: 2023-02-03

## 2023-01-31 RX ORDER — LIDOCAINE 50 MG/G
1 PATCH TOPICAL DAILY
Qty: 10 PATCH | Refills: 0 | Status: SHIPPED | OUTPATIENT
Start: 2023-01-31 | End: 2023-02-10

## 2023-01-31 NOTE — PROGRESS NOTES
Trauma Clinic Progress Note   1/31/2023     Date of injury: 1/19         GRIS/Injuries:   Patient Active Problem List   Diagnosis Code    Gastroesophageal reflux disease without esophagitis K21.9    Coronary artery disease involving coronary bypass graft of native heart without angina pectoris I25.810    Pure hypercholesterolemia E78.00    Psoriasis L40.9    Essential hypertension I10    Extrinsic asthma without complication F24.486    Class 2 severe obesity due to excess calories with serious comorbidity and body mass index (BMI) of 37.0 to 37.9 in adult ([facility] E66.01, Z68.37    History of coronary artery bypass graft Z95.1    Trigger middle finger of left hand M65.332    Pneumothorax J93.9       Surgeries:   Past Surgical History:   Procedure Laterality Date    CORONARY ARTERY BYPASS GRAFT      quadruple bypass age 43    JOINT REPLACEMENT      TOTAL HIP ARTHROPLASTY Left     mid 46s       Vital signs:    /73   Pulse 69   Temp 97.4 °F (36.3 °C) (Temporal)   Wt 270 lb (122.5 kg)   SpO2 95%   BMI 36.62 kg/m²     Medications:    Prior to Admission medications    Medication Sig Start Date End Date Taking? Authorizing Provider   lidocaine (LIDODERM) 5 % Place 1 patch onto the skin daily for 10 days 12 hours on, 12 hours off. 1/31/23 2/10/23 Yes JORGE Pope NP   oxyCODONE (ROXICODONE) 5 MG immediate release tablet Take 1 tablet by mouth every 6 hours as needed for Pain for up to 3 days. Intended supply: 3 days.  Take lowest dose possible to manage pain Max Daily Amount: 20 mg 1/31/23 2/3/23 Yes JORGE Cornell NP   losartan (COZAAR) 50 MG tablet Take 1 tablet by mouth daily 1/5/23  Yes Brian Salazar MD   aspirin 81 MG chewable tablet Take 81 mg by mouth daily   Yes Historical Provider, MD   rosuvastatin (CRESTOR) 10 MG tablet Take 1 tablet by mouth daily 5/31/22  Yes Kathy Leslie PA-C   montelukast (SINGULAIR) 10 MG tablet TAKE 1 TABLET BY MOUTH EVERY DAY 5/31/22  Yes Kathy Leslie PA-C cyclobenzaprine (FLEXERIL) 10 mg tablet TAKE 1 TABLET BY MOUTH THREE TIMES A DAY AS NEEDED FOR MUSCLE SPASMS 5/31/22  Yes Macario Lo PA-C   albuterol sulfate  (90 Base) MCG/ACT inhaler Inhale 2 puffs into the lungs 4 times daily as needed for Wheezing 12/13/19  Yes Macario Lo PA-C   omeprazole (PRILOSEC) 20 MG delayed release capsule Take 1 capsule by mouth daily 4/26/19  Yes Macario Lo PA-C   bisacodyl (DULCOLAX) 5 MG EC tablet Take 1 tablet by mouth daily  Patient not taking: Reported on 1/31/2023 1/23/23   Jamel Hy, DO   methocarbamol 1000 MG TABS Take 1,000 mg by mouth 4 times daily for 10 days  Patient not taking: Reported on 1/31/2023 1/22/23 2/1/23  Jamel Hy, DO   betamethasone dipropionate 0.05 % cream APPLY TOPICALLY 2 TIMES DAILY TP AFFECTED AREA 5/31/22   Macario Lo PA-C          CC:  Trauma follow up    Patient presents to the clinic today for follow up of a fall in which he sustained rib fracture and pneumothorax requiring chest tube placement. He states that his pain is mostly controlled with Tylenol and Robaxin. He denies chest pain or shortness of breath. He is asking when he can get back to exercising/walking. He states that he is eating and sleeping well. He has no further complaints. Chest tube site is healed with no signs of infection. Patient to see PCP in follow up. All progress notes have been reviewed. Physical Exam   Physical Exam  Vitals reviewed. Constitutional:       Appearance: Normal appearance. HENT:      Head: Normocephalic. Mouth/Throat:      Mouth: Mucous membranes are moist.   Eyes:      Pupils: Pupils are equal, round, and reactive to light. Cardiovascular:      Rate and Rhythm: Normal rate and regular rhythm. Pulses: Normal pulses. Heart sounds: Normal heart sounds. Pulmonary:      Effort: Pulmonary effort is normal.      Breath sounds: Normal breath sounds.    Abdominal:      Palpations: Abdomen is soft.   Musculoskeletal:         General: Normal range of motion. Cervical back: Normal range of motion. Skin:     General: Skin is warm and dry. Capillary Refill: Capillary refill takes less than 2 seconds. Neurological:      General: No focal deficit present. Mental Status: He is alert and oriented to person, place, and time. Psychiatric:         Mood and Affect: Mood normal.         Behavior: Behavior normal.         Thought Content: Thought content normal.         Judgment: Judgment normal.           Controlled substances monitoring: possible medication side effects, risk of tolerance and/or dependence, and alternative treatments discussed and OARRS report reviewed today- activity consistent with treatment plan. Education      Instructed to increase activity. Instructed on opioid medications. Assessment  Patient Active Problem List   Diagnosis Code    Gastroesophageal reflux disease without esophagitis K21.9    Coronary artery disease involving coronary bypass graft of native heart without angina pectoris I25.810    Pure hypercholesterolemia E78.00    Psoriasis L40.9    Essential hypertension I10    Extrinsic asthma without complication M83.252    Class 2 severe obesity due to excess calories with serious comorbidity and body mass index (BMI) of 37.0 to 37.9 in adult ([facility] E66.01, Z68.37    History of coronary artery bypass graft Z95.1    Trigger middle finger of left hand M65.332    Pneumothorax J93.9       Plan  RTC PRN  Follow up with PCP  Medications as ordered:  ibuprofen, tylenol, and lidocaine patch, oxycodone    Total time spent face-to-face with the patient, reviewing records and documentation was 15 minutes.      JORGE Avina NP

## 2023-06-01 DIAGNOSIS — I10 HYPERTENSION, UNSPECIFIED TYPE: ICD-10-CM

## 2023-06-01 NOTE — TELEPHONE ENCOUNTER
rosuvastatin (CRESTOR) 10 MG tablet   montelukast (SINGULAIR) 10 MG tablet   betamethasone dipropionate 0.05 % cream   omeprazole (PRILOSEC) 20 MG delayed release capsule   Pt needs refill please send to [facility]

## 2023-06-02 RX ORDER — LOSARTAN POTASSIUM 50 MG/1
50 TABLET ORAL DAILY
Qty: 90 TABLET | Refills: 3 | OUTPATIENT
Start: 2023-06-02

## 2023-06-14 DIAGNOSIS — E78.00 PURE HYPERCHOLESTEROLEMIA: ICD-10-CM

## 2023-06-14 DIAGNOSIS — K21.9 GASTROESOPHAGEAL REFLUX DISEASE WITHOUT ESOPHAGITIS: ICD-10-CM

## 2023-06-14 DIAGNOSIS — J30.2 SEASONAL ALLERGIES: ICD-10-CM

## 2023-06-14 DIAGNOSIS — R73.9 ELEVATED BLOOD SUGAR: ICD-10-CM

## 2023-06-14 DIAGNOSIS — I25.810 CORONARY ARTERY DISEASE INVOLVING CORONARY BYPASS GRAFT OF NATIVE HEART WITHOUT ANGINA PECTORIS: ICD-10-CM

## 2023-06-14 DIAGNOSIS — I10 ESSENTIAL HYPERTENSION: Primary | ICD-10-CM

## 2023-06-14 DIAGNOSIS — R06.2 WHEEZING: ICD-10-CM

## 2023-06-14 DIAGNOSIS — L40.9 PSORIASIS: ICD-10-CM

## 2023-06-15 RX ORDER — OMEPRAZOLE 20 MG/1
20 CAPSULE, DELAYED RELEASE ORAL DAILY
Qty: 90 CAPSULE | Refills: 0 | Status: SHIPPED | OUTPATIENT
Start: 2023-06-15

## 2023-06-15 RX ORDER — ROSUVASTATIN CALCIUM 10 MG/1
10 TABLET, COATED ORAL DAILY
Qty: 90 TABLET | Refills: 3 | Status: SHIPPED | OUTPATIENT
Start: 2023-06-15

## 2023-06-15 RX ORDER — MONTELUKAST SODIUM 10 MG/1
TABLET ORAL
Qty: 90 TABLET | Refills: 0 | Status: SHIPPED | OUTPATIENT
Start: 2023-06-15

## 2023-06-15 RX ORDER — BETAMETHASONE DIPROPIONATE 0.5 MG/G
CREAM TOPICAL
Qty: 45 G | Refills: 0 | Status: SHIPPED | OUTPATIENT
Start: 2023-06-15

## 2023-06-23 ENCOUNTER — TELEPHONE (OUTPATIENT)
Dept: FAMILY MEDICINE CLINIC | Age: 63
End: 2023-06-23

## 2023-06-23 ENCOUNTER — HOSPITAL ENCOUNTER (OUTPATIENT)
Age: 63
Discharge: HOME | End: 2023-06-23
Payer: MEDICARE

## 2023-06-23 DIAGNOSIS — R73.9 ELEVATED BLOOD SUGAR: ICD-10-CM

## 2023-06-23 DIAGNOSIS — E78.00 PURE HYPERCHOLESTEROLEMIA: ICD-10-CM

## 2023-06-23 DIAGNOSIS — I10 ESSENTIAL HYPERTENSION: ICD-10-CM

## 2023-06-23 DIAGNOSIS — I10 HYPERTENSION, UNSPECIFIED TYPE: ICD-10-CM

## 2023-06-23 LAB
CHOLESTEROL, TOTAL: 160 MG/DL (ref 0–199)
HBA1C MFR BLD: 6.1 % (ref 4–5.6)
HDLC SERPL-MCNC: 42 MG/DL
LDLC SERPL CALC-MCNC: 101 MG/DL (ref 0–99)
TRIGL SERPL-MCNC: 85 MG/DL (ref 0–149)
TSH SERPL-MCNC: 6.15 UIU/ML (ref 0.27–4.2)
VLDLC SERPL CALC-MCNC: 17 MG/DL

## 2023-06-23 PROCEDURE — 83036 HEMOGLOBIN GLYCOSYLATED A1C: CPT

## 2023-06-23 PROCEDURE — 36415 COLL VENOUS BLD VENIPUNCTURE: CPT

## 2023-06-23 PROCEDURE — 84443 ASSAY THYROID STIM HORMONE: CPT

## 2023-06-23 PROCEDURE — 80061 LIPID PANEL: CPT

## 2023-06-23 NOTE — TELEPHONE ENCOUNTER
Rx for losartan 50mg #90 with 3 refills, was verbally called in patients [facility] pharmacy.   Pt was out of medication, rx PCP sent over in January did not E-scribe to pharmacy, Violet Dileep

## 2023-06-25 RX ORDER — LOSARTAN POTASSIUM 50 MG/1
50 TABLET ORAL DAILY
Qty: 90 TABLET | Refills: 3 | Status: SHIPPED | OUTPATIENT
Start: 2023-06-25

## 2023-06-25 SDOH — ECONOMIC STABILITY: TRANSPORTATION INSECURITY
IN THE PAST 12 MONTHS, HAS LACK OF TRANSPORTATION KEPT YOU FROM MEETINGS, WORK, OR FROM GETTING THINGS NEEDED FOR DAILY LIVING?: PATIENT DECLINED

## 2023-06-25 SDOH — ECONOMIC STABILITY: FOOD INSECURITY: WITHIN THE PAST 12 MONTHS, YOU WORRIED THAT YOUR FOOD WOULD RUN OUT BEFORE YOU GOT MONEY TO BUY MORE.: PATIENT DECLINED

## 2023-06-25 SDOH — ECONOMIC STABILITY: INCOME INSECURITY: HOW HARD IS IT FOR YOU TO PAY FOR THE VERY BASICS LIKE FOOD, HOUSING, MEDICAL CARE, AND HEATING?: PATIENT DECLINED

## 2023-06-25 SDOH — ECONOMIC STABILITY: FOOD INSECURITY: WITHIN THE PAST 12 MONTHS, THE FOOD YOU BOUGHT JUST DIDN'T LAST AND YOU DIDN'T HAVE MONEY TO GET MORE.: PATIENT DECLINED

## 2023-06-25 SDOH — ECONOMIC STABILITY: HOUSING INSECURITY
IN THE LAST 12 MONTHS, WAS THERE A TIME WHEN YOU DID NOT HAVE A STEADY PLACE TO SLEEP OR SLEPT IN A SHELTER (INCLUDING NOW)?: PATIENT REFUSED

## 2023-06-25 ASSESSMENT — PATIENT HEALTH QUESTIONNAIRE - PHQ9
2. FEELING DOWN, DEPRESSED OR HOPELESS: 0
2. FEELING DOWN, DEPRESSED OR HOPELESS: NOT AT ALL
SUM OF ALL RESPONSES TO PHQ QUESTIONS 1-9: 0
1. LITTLE INTEREST OR PLEASURE IN DOING THINGS: 0
SUM OF ALL RESPONSES TO PHQ9 QUESTIONS 1 & 2: 0
SUM OF ALL RESPONSES TO PHQ QUESTIONS 1-9: 0
SUM OF ALL RESPONSES TO PHQ9 QUESTIONS 1 & 2: 0
SUM OF ALL RESPONSES TO PHQ QUESTIONS 1-9: 0
SUM OF ALL RESPONSES TO PHQ QUESTIONS 1-9: 0
1. LITTLE INTEREST OR PLEASURE IN DOING THINGS: NOT AT ALL

## 2023-06-28 ENCOUNTER — OFFICE VISIT (OUTPATIENT)
Dept: FAMILY MEDICINE CLINIC | Age: 63
End: 2023-06-28
Payer: COMMERCIAL

## 2023-06-28 VITALS
HEIGHT: 72 IN | OXYGEN SATURATION: 97 % | DIASTOLIC BLOOD PRESSURE: 77 MMHG | HEART RATE: 68 BPM | SYSTOLIC BLOOD PRESSURE: 128 MMHG | BODY MASS INDEX: 32.51 KG/M2 | WEIGHT: 240 LBS | TEMPERATURE: 97.5 F | RESPIRATION RATE: 16 BRPM

## 2023-06-28 DIAGNOSIS — I10 ESSENTIAL HYPERTENSION: Primary | ICD-10-CM

## 2023-06-28 DIAGNOSIS — R73.03 PREDIABETES: ICD-10-CM

## 2023-06-28 DIAGNOSIS — R79.89 ELEVATED TSH: ICD-10-CM

## 2023-06-28 DIAGNOSIS — Z95.1 HISTORY OF CORONARY ARTERY BYPASS GRAFT: ICD-10-CM

## 2023-06-28 DIAGNOSIS — E66.09 CLASS 1 OBESITY DUE TO EXCESS CALORIES WITH SERIOUS COMORBIDITY AND BODY MASS INDEX (BMI) OF 32.0 TO 32.9 IN ADULT: ICD-10-CM

## 2023-06-28 DIAGNOSIS — E78.00 PURE HYPERCHOLESTEROLEMIA: ICD-10-CM

## 2023-06-28 PROCEDURE — 3074F SYST BP LT 130 MM HG: CPT | Performed by: STUDENT IN AN ORGANIZED HEALTH CARE EDUCATION/TRAINING PROGRAM

## 2023-06-28 PROCEDURE — 3078F DIAST BP <80 MM HG: CPT | Performed by: STUDENT IN AN ORGANIZED HEALTH CARE EDUCATION/TRAINING PROGRAM

## 2023-06-28 PROCEDURE — 99214 OFFICE O/P EST MOD 30 MIN: CPT | Performed by: STUDENT IN AN ORGANIZED HEALTH CARE EDUCATION/TRAINING PROGRAM

## 2023-06-28 SDOH — ECONOMIC STABILITY: INCOME INSECURITY: IN THE LAST 12 MONTHS, WAS THERE A TIME WHEN YOU WERE NOT ABLE TO PAY THE MORTGAGE OR RENT ON TIME?: NO

## 2023-06-28 SDOH — ECONOMIC STABILITY: FOOD INSECURITY: WITHIN THE PAST 12 MONTHS, YOU WORRIED THAT YOUR FOOD WOULD RUN OUT BEFORE YOU GOT MONEY TO BUY MORE.: NEVER TRUE

## 2023-06-28 SDOH — ECONOMIC STABILITY: FOOD INSECURITY: WITHIN THE PAST 12 MONTHS, THE FOOD YOU BOUGHT JUST DIDN'T LAST AND YOU DIDN'T HAVE MONEY TO GET MORE.: NEVER TRUE

## 2023-06-28 SDOH — ECONOMIC STABILITY: TRANSPORTATION INSECURITY
IN THE PAST 12 MONTHS, HAS LACK OF TRANSPORTATION KEPT YOU FROM MEETINGS, WORK, OR FROM GETTING THINGS NEEDED FOR DAILY LIVING?: NO

## 2023-06-28 SDOH — ECONOMIC STABILITY: TRANSPORTATION INSECURITY
IN THE PAST 12 MONTHS, HAS THE LACK OF TRANSPORTATION KEPT YOU FROM MEDICAL APPOINTMENTS OR FROM GETTING MEDICATIONS?: NO

## 2023-06-28 SDOH — ECONOMIC STABILITY: HOUSING INSECURITY: IN THE LAST 12 MONTHS, HOW MANY PLACES HAVE YOU LIVED?: 1

## 2023-06-28 SDOH — ECONOMIC STABILITY: HOUSING INSECURITY
IN THE LAST 12 MONTHS, WAS THERE A TIME WHEN YOU DID NOT HAVE A STEADY PLACE TO SLEEP OR SLEPT IN A SHELTER (INCLUDING NOW)?: NO

## 2023-06-28 ASSESSMENT — SOCIAL DETERMINANTS OF HEALTH (SDOH)
DO YOU BELONG TO ANY CLUBS OR ORGANIZATIONS SUCH AS CHURCH GROUPS UNIONS, FRATERNAL OR ATHLETIC GROUPS, OR SCHOOL GROUPS?: NO
HOW OFTEN DO YOU GET TOGETHER WITH FRIENDS OR RELATIVES?: MORE THAN THREE TIMES A WEEK
IN A TYPICAL WEEK, HOW MANY TIMES DO YOU TALK ON THE PHONE WITH FAMILY, FRIENDS, OR NEIGHBORS?: MORE THAN THREE TIMES A WEEK
HOW HARD IS IT FOR YOU TO PAY FOR THE VERY BASICS LIKE FOOD, HOUSING, MEDICAL CARE, AND HEATING?: NOT HARD AT ALL
HOW OFTEN DO YOU ATTENT MEETINGS OF THE CLUB OR ORGANIZATION YOU BELONG TO?: NEVER
HOW OFTEN DO YOU ATTEND CHURCH OR RELIGIOUS SERVICES?: NEVER

## 2023-06-28 ASSESSMENT — ENCOUNTER SYMPTOMS
TROUBLE SWALLOWING: 0
ABDOMINAL PAIN: 0
SHORTNESS OF BREATH: 0

## 2023-06-28 ASSESSMENT — LIFESTYLE VARIABLES
HOW MANY STANDARD DRINKS CONTAINING ALCOHOL DO YOU HAVE ON A TYPICAL DAY: PATIENT DOES NOT DRINK
HOW OFTEN DO YOU HAVE A DRINK CONTAINING ALCOHOL: NEVER

## 2023-08-16 DIAGNOSIS — L40.9 PSORIASIS: ICD-10-CM

## 2023-08-16 DIAGNOSIS — K21.9 GASTROESOPHAGEAL REFLUX DISEASE WITHOUT ESOPHAGITIS: ICD-10-CM

## 2023-08-16 DIAGNOSIS — R06.2 WHEEZING: ICD-10-CM

## 2023-08-16 DIAGNOSIS — J30.2 SEASONAL ALLERGIES: ICD-10-CM

## 2023-08-17 RX ORDER — OMEPRAZOLE 20 MG/1
CAPSULE, DELAYED RELEASE ORAL
Qty: 90 CAPSULE | Refills: 3 | Status: SHIPPED | OUTPATIENT
Start: 2023-08-17

## 2023-08-17 RX ORDER — BETAMETHASONE DIPROPIONATE 0.5 MG/G
CREAM TOPICAL
Qty: 45 G | Refills: 3 | Status: SHIPPED | OUTPATIENT
Start: 2023-08-17

## 2023-08-17 RX ORDER — MONTELUKAST SODIUM 10 MG/1
TABLET ORAL
Qty: 90 TABLET | Refills: 3 | Status: SHIPPED | OUTPATIENT
Start: 2023-08-17

## 2023-10-02 ENCOUNTER — TELEPHONE (OUTPATIENT)
Dept: FAMILY MEDICINE CLINIC | Age: 63
End: 2023-10-02

## 2023-10-02 DIAGNOSIS — I25.810 CORONARY ARTERY DISEASE INVOLVING CORONARY BYPASS GRAFT OF NATIVE HEART WITHOUT ANGINA PECTORIS: Primary | ICD-10-CM

## 2023-10-02 DIAGNOSIS — Z95.1 HISTORY OF CORONARY ARTERY BYPASS GRAFT: ICD-10-CM

## 2023-10-02 DIAGNOSIS — E66.01 CLASS 2 SEVERE OBESITY DUE TO EXCESS CALORIES WITH SERIOUS COMORBIDITY AND BODY MASS INDEX (BMI) OF 37.0 TO 37.9 IN ADULT (HCC): ICD-10-CM

## 2023-10-03 NOTE — TELEPHONE ENCOUNTER
1. Coronary artery disease involving coronary bypass graft of native heart without angina pectoris  -     Handicap Placard MISC; Starting Tue 10/3/2023, Disp-1 each, R-0, PrintCannot walk greater than 200 feet without stopping Expires: 10/3/2028  2. History of coronary artery bypass graft  -     [facility]; Starting Tue 10/3/2023, Disp-1 each, R-0, PrintCannot walk greater than 200 feet without stopping Expires: 10/3/2028  3.  Class 2 severe obesity due to excess calories with serious comorbidity and body mass index (BMI) of 37.0 to 37.9 in adult [facility])  -     [facility]; Starting Tue 10/3/2023, Disp-1 each, R-0, PrintCannot walk greater than 200 feet without stopping Expires: 10/3/2028

## 2023-10-24 ENCOUNTER — TELEPHONE (OUTPATIENT)
Dept: FAMILY MEDICINE CLINIC | Age: 63
End: 2023-10-24

## 2023-10-24 DIAGNOSIS — M65.332 TRIGGER MIDDLE FINGER OF LEFT HAND: Primary | ICD-10-CM

## 2023-10-24 NOTE — TELEPHONE ENCOUNTER
Pt called he would like a referral to [facility], he wants to look into having surgery done on his bad finger, thanks

## 2023-11-02 ENCOUNTER — OFFICE VISIT (OUTPATIENT)
Dept: ORTHOPEDIC SURGERY | Age: 63
End: 2023-11-02
Payer: COMMERCIAL

## 2023-11-02 DIAGNOSIS — M65.332 TRIGGER FINGER, LEFT MIDDLE FINGER: Primary | ICD-10-CM

## 2023-11-02 PROCEDURE — 99214 OFFICE O/P EST MOD 30 MIN: CPT | Performed by: STUDENT IN AN ORGANIZED HEALTH CARE EDUCATION/TRAINING PROGRAM

## 2023-11-02 NOTE — PROGRESS NOTES
Referring Provider:   Juan Espinosa MD  [address] [id number] [facility],  [address] [place] [address]    CHIEF COMPLAINT: Left middle finger triggering     HPI update 2023: He has had recurrence of his left middle finger trigger finger. He did have an injection in January which provided some temporary relief. He is here today to discuss surgical management. Denies any injuries. Denies fever chills. Denies numbness tingling. HPI:      Jose Valenzuela is a 57y.o. year old male who is right-hand dominant. He has had left middle finger pain and triggering for a number of months. He states that at night the symptoms get stuck down and he has to pop it back up. The pain is located on the palmar side of his middle finger. None of his other fingers hurt. He has had no injuries. He has never had any treatment for this problem in the past.  Pain is sharp and nonradiating. It is worse with flexion. Denies fever chills. Denies numbness tingling.     PAST MEDICAL HISTORY  Past Medical History:   Diagnosis Date    CAD (coronary artery disease)     age 43     GERD (gastroesophageal reflux disease)     Hyperlipidemia        PAST SURGICAL HISTORY  Past Surgical History:   Procedure Laterality Date    CORONARY ARTERY BYPASS GRAFT      quadruple bypass age 43    JOINT REPLACEMENT      TOTAL HIP ARTHROPLASTY Left     mid 46s       FAMILY HISTORY   Family History   Problem Relation Age of Onset    No Known Problems Mother     Cancer Father         unknown cancer, liver, alcoholic    Alcohol Abuse Father        SOCIAL HISTORY  Social History     Occupational History    Not on file   Tobacco Use    Smoking status: Former     Packs/day: 1.00     Years: 30.00     Additional pack years: 0.00     Total pack years: 30.00     Types: Cigarettes     Quit date: 10/15/2014     Years since quittin.0    Smokeless tobacco: Never   Substance and Sexual Activity    Alcohol use: Yes     Comment: occasional, on special occasions

## 2023-11-07 ENCOUNTER — TELEPHONE (OUTPATIENT)
Dept: ORTHOPEDIC SURGERY | Age: 63
End: 2023-11-07

## 2023-11-07 ENCOUNTER — TELEPHONE (OUTPATIENT)
Dept: FAMILY MEDICINE CLINIC | Age: 63
End: 2023-11-07

## 2023-11-07 NOTE — TELEPHONE ENCOUNTER
----- Message from Omayra Jarrett sent at 11/7/2023 12:23 PM EST -----  Subject: Message to Provider    QUESTIONS  Information for Provider? patient called and states that Dr. Chely Leblanc   office is waiting on an OK to approve for his finger surgery on 12/1,   states he does not need a pre op and please call if any questions, call   Patsy Stark # [phone] at doctors office, thank you   ---------------------------------------------------------------------------  --------------  [address]  [phone]; OK to leave message on voicemail  ---------------------------------------------------------------------------  --------------  SCRIPT ANSWERS  Relationship to Patient?  Self

## 2023-11-07 NOTE — TELEPHONE ENCOUNTER
Surgical Procedure: Left Middle Trigger Finger Release  ICD Code: Y31.151   CPT Code: 50197  Anesthesia: [facility]  Date: 12/1/2023  Time: a.m. Place: [address] [place] [address]  Surgeon: Ra David D.O. Medications reviewed with the patient / holding the following medications: D/C Aspirin 7 days pre-op, patient states he rarely takes Aspirin. Pre Op Instructions reviewed with the patient. Informed patient: A nurse from [facility] will contact him with instructions for the day of surgery. The patient expresses understanding and is in agreement with the plan. Post Op Appointment: Date: 12/14/2023 Time: 9:40 a.m.

## 2023-11-20 ENCOUNTER — OFFICE VISIT (OUTPATIENT)
Dept: FAMILY MEDICINE CLINIC | Age: 63
End: 2023-11-20
Payer: COMMERCIAL

## 2023-11-20 VITALS
HEART RATE: 74 BPM | BODY MASS INDEX: 32.64 KG/M2 | TEMPERATURE: 96.9 F | RESPIRATION RATE: 20 BRPM | SYSTOLIC BLOOD PRESSURE: 130 MMHG | DIASTOLIC BLOOD PRESSURE: 78 MMHG | WEIGHT: 241 LBS | HEIGHT: 72 IN | OXYGEN SATURATION: 97 %

## 2023-11-20 DIAGNOSIS — Z95.1 HISTORY OF CORONARY ARTERY BYPASS GRAFT: ICD-10-CM

## 2023-11-20 DIAGNOSIS — E55.9 VITAMIN D DEFICIENCY: ICD-10-CM

## 2023-11-20 DIAGNOSIS — R79.89 ELEVATED TSH: ICD-10-CM

## 2023-11-20 DIAGNOSIS — E78.00 PURE HYPERCHOLESTEROLEMIA: ICD-10-CM

## 2023-11-20 DIAGNOSIS — M65.332 TRIGGER MIDDLE FINGER OF LEFT HAND: ICD-10-CM

## 2023-11-20 DIAGNOSIS — Z01.818 PRE-OP EXAM: Primary | ICD-10-CM

## 2023-11-20 DIAGNOSIS — R73.09 ELEVATED HEMOGLOBIN A1C: ICD-10-CM

## 2023-11-20 DIAGNOSIS — I10 ESSENTIAL HYPERTENSION: ICD-10-CM

## 2023-11-20 DIAGNOSIS — Z12.5 SCREENING FOR PROSTATE CANCER: ICD-10-CM

## 2023-11-20 DIAGNOSIS — Z28.21 REFUSES TETANUS, DIPHTHERIA, AND ACELLULAR PERTUSSIS (TDAP) VACCINATION: ICD-10-CM

## 2023-11-20 PROCEDURE — 93000 ELECTROCARDIOGRAM COMPLETE: CPT | Performed by: STUDENT IN AN ORGANIZED HEALTH CARE EDUCATION/TRAINING PROGRAM

## 2023-11-20 PROCEDURE — 3078F DIAST BP <80 MM HG: CPT | Performed by: STUDENT IN AN ORGANIZED HEALTH CARE EDUCATION/TRAINING PROGRAM

## 2023-11-20 PROCEDURE — 99213 OFFICE O/P EST LOW 20 MIN: CPT | Performed by: STUDENT IN AN ORGANIZED HEALTH CARE EDUCATION/TRAINING PROGRAM

## 2023-11-20 PROCEDURE — 3075F SYST BP GE 130 - 139MM HG: CPT | Performed by: STUDENT IN AN ORGANIZED HEALTH CARE EDUCATION/TRAINING PROGRAM

## 2023-11-20 ASSESSMENT — ENCOUNTER SYMPTOMS
TROUBLE SWALLOWING: 0
ABDOMINAL PAIN: 0
SHORTNESS OF BREATH: 0

## 2023-11-20 NOTE — PROGRESS NOTES
Medical History:  has a past medical history of CAD (coronary artery disease), GERD (gastroesophageal reflux disease), and Hyperlipidemia. Past Surgical History:  has a past surgical history that includes Coronary artery bypass graft; Total hip arthroplasty (Left); and joint replacement. Social History:  reports that he quit smoking about 9 years ago. His smoking use included cigarettes. He has a 30.00 pack-year smoking history. He has never used smokeless tobacco. He reports current alcohol use. He reports that he does not use drugs. Family History: family history includes Alcohol Abuse in his father; Cancer in his father; No Known Problems in his mother. Allergies: Patient has no known allergies. Medications:   Current Outpatient Medications   Medication Sig Dispense Refill    Handicap Placard MISC by Does not apply route Cannot walk greater than 200 feet without stopping  Expires: 10/3/2028 1 each 0    omeprazole (PRILOSEC) 20 MG delayed release capsule TAKE 1 CAPSULE BY MOUTH EVERY DAY 90 capsule 3    montelukast (SINGULAIR) 10 MG tablet TAKE 1 TABLET BY MOUTH EVERY DAY 90 tablet 3    betamethasone dipropionate 0.05 % cream APPLY TOPICALLY TO THE AFFECTED 2 TIMES DAILY 45 g 3    losartan (COZAAR) 50 MG tablet Take 1 tablet by mouth daily 90 tablet 3    rosuvastatin (CRESTOR) 10 MG tablet Take 1 tablet by mouth daily 90 tablet 3    aspirin 81 MG chewable tablet Take 1 tablet by mouth daily      cyclobenzaprine (FLEXERIL) 10 mg tablet TAKE 1 TABLET BY MOUTH THREE TIMES A DAY AS NEEDED FOR MUSCLE SPASMS 90 tablet 1    albuterol sulfate  (90 Base) MCG/ACT inhaler Inhale 2 puffs into the lungs 4 times daily as needed for Wheezing (Patient not taking: Reported on 11/2/2023) 1 Inhaler 2     No current facility-administered medications for this visit. Allergies: Patient has no known allergies.      Review of Systems   Constitutional:  Negative for activity change, appetite change, fatigue, fever and

## 2023-11-30 ENCOUNTER — CLINICAL DOCUMENTATION (OUTPATIENT)
Dept: ORTHOPEDIC SURGERY | Age: 63
End: 2023-11-30

## 2023-11-30 NOTE — PROGRESS NOTES
Orthopedic surgery interval H&P entered 2023      CHIEF COMPLAINT: Left middle finger triggering     HPI update 2023: He has had recurrence of his left middle finger trigger finger. He did have an injection in January which provided some temporary relief. He is here today to discuss surgical management. Denies any injuries. Denies fever chills. Denies numbness tingling. HPI:       Abdias Mao is a 57y.o. year old male who is right-hand dominant. He has had left middle finger pain and triggering for a number of months. He states that at night the symptoms get stuck down and he has to pop it back up. The pain is located on the palmar side of his middle finger. None of his other fingers hurt. He has had no injuries. He has never had any treatment for this problem in the past.  Pain is sharp and nonradiating. It is worse with flexion. Denies fever chills. Denies numbness tingling.      PAST MEDICAL HISTORY  Past Medical History        Past Medical History:   Diagnosis Date    CAD (coronary artery disease)       age 43     GERD (gastroesophageal reflux disease)      Hyperlipidemia              PAST SURGICAL HISTORY  Past Surgical History         Past Surgical History:   Procedure Laterality Date    CORONARY ARTERY BYPASS GRAFT         quadruple bypass age 43    JOINT REPLACEMENT        TOTAL HIP ARTHROPLASTY Left       mid 46s            FAMILY HISTORY   Family History         Family History   Problem Relation Age of Onset    No Known Problems Mother      Cancer Father           unknown cancer, liver, alcoholic    Alcohol Abuse Father              SOCIAL HISTORY  Social History            Occupational History    Not on file   Tobacco Use    Smoking status: Former       Packs/day: 1.00       Years: 30.00       Additional pack years: 0.00       Total pack years: 30.00       Types: Cigarettes       Quit date: 10/15/2014       Years since quittin.0    Smokeless tobacco: Never   Substance

## 2023-12-01 ENCOUNTER — TELEPHONE (OUTPATIENT)
Dept: ORTHOPEDIC SURGERY | Age: 63
End: 2023-12-01

## 2023-12-01 DIAGNOSIS — M65.332 TRIGGER FINGER, LEFT MIDDLE FINGER: Primary | ICD-10-CM

## 2023-12-01 RX ORDER — HYDROCODONE BITARTRATE AND ACETAMINOPHEN 5; 325 MG/1; MG/1
1 TABLET ORAL EVERY 6 HOURS PRN
Qty: 12 TABLET | Refills: 0 | Status: SHIPPED | OUTPATIENT
Start: 2023-12-01 | End: 2023-12-04

## 2023-12-01 NOTE — TELEPHONE ENCOUNTER
Post op pain meds sent to pharmacy pt received supine in bed on 5S.  pt with continued complaints of nausea, initially not wanting to participate with PT, however agreeable with education and encouragement.

## 2023-12-14 ENCOUNTER — OFFICE VISIT (OUTPATIENT)
Dept: ORTHOPEDIC SURGERY | Age: 63
End: 2023-12-14

## 2023-12-14 DIAGNOSIS — M65.332 TRIGGER FINGER, LEFT MIDDLE FINGER: Primary | ICD-10-CM

## 2023-12-14 PROCEDURE — 99024 POSTOP FOLLOW-UP VISIT: CPT | Performed by: STUDENT IN AN ORGANIZED HEALTH CARE EDUCATION/TRAINING PROGRAM

## 2023-12-14 NOTE — PROGRESS NOTES
Follow Up Post Operative Visit     Surgery: Left middle finger trigger release  Date: 12/1/2023    Subjective:    Magi Abrams is here for follow up visit s/p above procedure. He is doing well. His symptoms have resolved 100%. He is no longer having triggering or pain over the base of his middle finger. He still has about a 3 degree flexion contracture which she is working on stretching. He has regained full flexion without pain. He is happy with his results. Controlled Substances Monitoring:        Physical Exam:    No data recorded    General: Alert and oriented x3, no acute distress  Cardiovascular/pulmonary: No labored breathing, peripheral perfusion intact  Musculoskeletal:    Left hand: Left middle finger incision well-approximated sutures removed no signs of infection. There is no triggering. Mild tenderness over his incision. He has full flexion and can achieve near full extension but he lacks about 3 degrees of terminal extension which was present for years prior to surgery and is warm well-perfused. Sensation is intact in the radial ulnar finger side of the middle          Assessment and Plan: 2 weeks status post left middle finger trigger release  -No heavy lifting pushing or pulling for 2 additional weeks. We talked about scar massage and desensitization. He is continuing to work on stretching his PIP joint. Overall he is very happy with results. He has no pain and no triggering. He can follow-up with me on an as-needed basis            Huma Dawkins DO   Orthopaedic Surgery   12/14/23  10:14 AM    Note: This report was completed using [employer] voiced recognition software. Every effort has been made to ensure accuracy; however, inadvertent computerized transcription errors may be present.

## 2023-12-27 ENCOUNTER — TELEPHONE (OUTPATIENT)
Dept: ORTHOPEDIC SURGERY | Age: 63
End: 2023-12-27

## 2023-12-27 NOTE — TELEPHONE ENCOUNTER
Left middle trigger finger release 12/1/23    Patient still having a lot of stiffness and pain in the finger. States he is unsure if the pain in the joint is arthritis or related to the surgery. He is questioning how long is normal after surgery to have this much pain?     He was to follow up on an as needed basis

## 2024-03-17 ASSESSMENT — PATIENT HEALTH QUESTIONNAIRE - PHQ9
SUM OF ALL RESPONSES TO PHQ QUESTIONS 1-9: 0
1. LITTLE INTEREST OR PLEASURE IN DOING THINGS: NOT AT ALL
1. LITTLE INTEREST OR PLEASURE IN DOING THINGS: NOT AT ALL
SUM OF ALL RESPONSES TO PHQ9 QUESTIONS 1 & 2: 0
2. FEELING DOWN, DEPRESSED OR HOPELESS: NOT AT ALL
SUM OF ALL RESPONSES TO PHQ QUESTIONS 1-9: 0
SUM OF ALL RESPONSES TO PHQ QUESTIONS 1-9: 0
SUM OF ALL RESPONSES TO PHQ9 QUESTIONS 1 & 2: 0
SUM OF ALL RESPONSES TO PHQ QUESTIONS 1-9: 0
2. FEELING DOWN, DEPRESSED OR HOPELESS: NOT AT ALL

## 2024-03-19 ENCOUNTER — OFFICE VISIT (OUTPATIENT)
Dept: FAMILY MEDICINE CLINIC | Age: 64
End: 2024-03-19
Payer: COMMERCIAL

## 2024-03-19 VITALS
DIASTOLIC BLOOD PRESSURE: 82 MMHG | BODY MASS INDEX: 33.32 KG/M2 | RESPIRATION RATE: 20 BRPM | TEMPERATURE: 96.9 F | HEIGHT: 72 IN | HEART RATE: 73 BPM | WEIGHT: 246 LBS | SYSTOLIC BLOOD PRESSURE: 128 MMHG | OXYGEN SATURATION: 95 %

## 2024-03-19 DIAGNOSIS — R73.03 PREDIABETES: ICD-10-CM

## 2024-03-19 DIAGNOSIS — R79.89 ELEVATED TSH: ICD-10-CM

## 2024-03-19 DIAGNOSIS — E55.9 VITAMIN D DEFICIENCY: ICD-10-CM

## 2024-03-19 DIAGNOSIS — I10 ESSENTIAL HYPERTENSION: Primary | ICD-10-CM

## 2024-03-19 DIAGNOSIS — E78.00 PURE HYPERCHOLESTEROLEMIA: ICD-10-CM

## 2024-03-19 DIAGNOSIS — Z12.5 SCREENING FOR PROSTATE CANCER: ICD-10-CM

## 2024-03-19 DIAGNOSIS — E66.09 CLASS 1 OBESITY DUE TO EXCESS CALORIES WITH SERIOUS COMORBIDITY AND BODY MASS INDEX (BMI) OF 32.0 TO 32.9 IN ADULT: ICD-10-CM

## 2024-03-19 DIAGNOSIS — I25.810 CORONARY ARTERY DISEASE INVOLVING CORONARY BYPASS GRAFT OF NATIVE HEART WITHOUT ANGINA PECTORIS: ICD-10-CM

## 2024-03-19 DIAGNOSIS — I10 ESSENTIAL HYPERTENSION: ICD-10-CM

## 2024-03-19 DIAGNOSIS — L40.9 PSORIASIS: ICD-10-CM

## 2024-03-19 DIAGNOSIS — Z13.6 SCREENING FOR AAA (ABDOMINAL AORTIC ANEURYSM): ICD-10-CM

## 2024-03-19 DIAGNOSIS — Z78.9: ICD-10-CM

## 2024-03-19 DIAGNOSIS — R73.03 PRE-DIABETES: ICD-10-CM

## 2024-03-19 DIAGNOSIS — I10 HYPERTENSION, UNSPECIFIED TYPE: ICD-10-CM

## 2024-03-19 DIAGNOSIS — Z87.891 PERSONAL HISTORY OF TOBACCO USE: ICD-10-CM

## 2024-03-19 PROBLEM — E66.811 CLASS 1 OBESITY DUE TO EXCESS CALORIES WITH SERIOUS COMORBIDITY AND BODY MASS INDEX (BMI) OF 32.0 TO 32.9 IN ADULT: Status: ACTIVE | Noted: 2022-09-07

## 2024-03-19 LAB
ABSOLUTE IMMATURE GRANULOCYTE: 0.03 K/UL (ref 0–0.58)
ALBUMIN SERPL-MCNC: 4.3 G/DL (ref 3.5–5.2)
ALP BLD-CCNC: 61 U/L (ref 40–129)
ALT SERPL-CCNC: 17 U/L (ref 0–40)
ANION GAP SERPL CALCULATED.3IONS-SCNC: 12 MMOL/L (ref 7–16)
AST SERPL-CCNC: 23 U/L (ref 0–39)
BASOPHILS ABSOLUTE: 0.06 K/UL (ref 0–0.2)
BASOPHILS RELATIVE PERCENT: 1 % (ref 0–2)
BILIRUB SERPL-MCNC: 0.5 MG/DL (ref 0–1.2)
BUN BLDV-MCNC: 12 MG/DL (ref 6–23)
CALCIUM SERPL-MCNC: 9.8 MG/DL (ref 8.6–10.2)
CHLORIDE BLD-SCNC: 105 MMOL/L (ref 98–107)
CHOLESTEROL: 165 MG/DL
CO2: 24 MMOL/L (ref 22–29)
CREAT SERPL-MCNC: 0.7 MG/DL (ref 0.7–1.2)
EOSINOPHILS ABSOLUTE: 0.15 K/UL (ref 0.05–0.5)
EOSINOPHILS RELATIVE PERCENT: 2 % (ref 0–6)
GFR SERPL CREATININE-BSD FRML MDRD: >60 ML/MIN/1.73M2
GLUCOSE BLD-MCNC: 98 MG/DL (ref 74–99)
HBA1C MFR BLD: 5.8 %
HCT VFR BLD CALC: 45.2 % (ref 37–54)
HDLC SERPL-MCNC: 43 MG/DL
HEMOGLOBIN: 15.4 G/DL (ref 12.5–16.5)
IMMATURE GRANULOCYTES: 0 % (ref 0–5)
LDL CHOLESTEROL: 108 MG/DL
LYMPHOCYTES ABSOLUTE: 1.72 K/UL (ref 1.5–4)
LYMPHOCYTES RELATIVE PERCENT: 23 % (ref 20–42)
MCH RBC QN AUTO: 32.1 PG (ref 26–35)
MCHC RBC AUTO-ENTMCNC: 34.1 G/DL (ref 32–34.5)
MCV RBC AUTO: 94.2 FL (ref 80–99.9)
MONOCYTES ABSOLUTE: 0.5 K/UL (ref 0.1–0.95)
MONOCYTES RELATIVE PERCENT: 7 % (ref 2–12)
NEUTROPHILS ABSOLUTE: 5 K/UL (ref 1.8–7.3)
NEUTROPHILS RELATIVE PERCENT: 67 % (ref 43–80)
PDW BLD-RTO: 13.4 % (ref 11.5–15)
PLATELET # BLD: 329 K/UL (ref 130–450)
PMV BLD AUTO: 9.8 FL (ref 7–12)
POTASSIUM SERPL-SCNC: 4.8 MMOL/L (ref 3.5–5)
PROSTATE SPECIFIC ANTIGEN: 0.39 NG/ML (ref 0–4)
RBC # BLD: 4.8 M/UL (ref 3.8–5.8)
SODIUM BLD-SCNC: 141 MMOL/L (ref 132–146)
TOTAL PROTEIN: 7.3 G/DL (ref 6.4–8.3)
TRIGL SERPL-MCNC: 70 MG/DL
TSH SERPL DL<=0.05 MIU/L-ACNC: 6.87 UIU/ML (ref 0.27–4.2)
VITAMIN D 25-HYDROXY: 26.2 NG/ML (ref 30–100)
VLDLC SERPL CALC-MCNC: 14 MG/DL
WBC # BLD: 7.5 K/UL (ref 4.5–11.5)

## 2024-03-19 PROCEDURE — 3079F DIAST BP 80-89 MM HG: CPT | Performed by: STUDENT IN AN ORGANIZED HEALTH CARE EDUCATION/TRAINING PROGRAM

## 2024-03-19 PROCEDURE — 83036 HEMOGLOBIN GLYCOSYLATED A1C: CPT | Performed by: STUDENT IN AN ORGANIZED HEALTH CARE EDUCATION/TRAINING PROGRAM

## 2024-03-19 PROCEDURE — G0296 VISIT TO DETERM LDCT ELIG: HCPCS | Performed by: STUDENT IN AN ORGANIZED HEALTH CARE EDUCATION/TRAINING PROGRAM

## 2024-03-19 PROCEDURE — 3074F SYST BP LT 130 MM HG: CPT | Performed by: STUDENT IN AN ORGANIZED HEALTH CARE EDUCATION/TRAINING PROGRAM

## 2024-03-19 PROCEDURE — 1123F ACP DISCUSS/DSCN MKR DOCD: CPT | Performed by: STUDENT IN AN ORGANIZED HEALTH CARE EDUCATION/TRAINING PROGRAM

## 2024-03-19 PROCEDURE — 99214 OFFICE O/P EST MOD 30 MIN: CPT | Performed by: STUDENT IN AN ORGANIZED HEALTH CARE EDUCATION/TRAINING PROGRAM

## 2024-03-19 RX ORDER — BETAMETHASONE DIPROPIONATE 0.5 MG/G
CREAM TOPICAL
Qty: 45 G | Refills: 3 | Status: SHIPPED | OUTPATIENT
Start: 2024-03-19

## 2024-03-19 RX ORDER — ROSUVASTATIN CALCIUM 10 MG/1
10 TABLET, COATED ORAL DAILY
Qty: 90 TABLET | Refills: 3 | Status: SHIPPED | OUTPATIENT
Start: 2024-03-19

## 2024-03-19 RX ORDER — LOSARTAN POTASSIUM 50 MG/1
50 TABLET ORAL DAILY
Qty: 90 TABLET | Refills: 3 | Status: SHIPPED | OUTPATIENT
Start: 2024-03-19

## 2024-03-19 ASSESSMENT — ENCOUNTER SYMPTOMS
SHORTNESS OF BREATH: 0
TROUBLE SWALLOWING: 0
ABDOMINAL PAIN: 0

## 2024-03-19 NOTE — PROGRESS NOTES
Gray Gallegos (:  1960) is a 63 y.o. male, established patient follow up , here for evaluation of the following:  Follow-up         ASSESSMENT/PLAN    Labs drawn today, screening ordered for AAA and lung cancer   1. Essential hypertension  Chronic, well controlled, continue current medications and treatment plan   2. Coronary artery disease involving coronary bypass graft of native heart without angina pectoris  No further symptoms of cardiac ischemia, on statin and Asprin, ARB, no BB, HR ok    -     rosuvastatin (CRESTOR) 10 MG tablet; Take 1 tablet by mouth daily, Disp-90 tablet, R-3Normal  3. Class 1 obesity due to excess calories with serious comorbidity and body mass index (BMI) of 32.0 to 32.9 in adult  Chronic, recommended increase nutrition and exercise   4. Prediabetes  Chronic, has gone from A1C 6.1 to 5.8, did stop drinking alcohol   5. Psoriasis  -     betamethasone dipropionate 0.05 % cream; APPLY TOPICALLY TO THE AFFECTED 2 TIMES DAILY, Disp-45 g, R-3, Normal  6. Pure hypercholesterolemia  -     rosuvastatin (CRESTOR) 10 MG tablet; Take 1 tablet by mouth daily, Disp-90 tablet, R-3Normal  7. Pre-diabetes  -     POCT glycosylated hemoglobin (Hb A1C)  8. Screening for AAA (abdominal aortic aneurysm)  -     Vascular AAA screening; Future  9. Personal history of tobacco use   Has quit smoking   -     ME VISIT TO DISCUSS LUNG CA SCREEN W LDCT  -     CT Lung Screen (Initial/Annual/Baseline); Future  10. Hypertension, unspecified type  -     losartan (COZAAR) 50 MG tablet; Take 1 tablet by mouth daily, Disp-90 tablet, R-3He may have just filled so save for next refilNormal  11. Does not drink alcohol    Return in about 6 months (around 2024) for AWV.         Subjective   Portions of this note were completed by asmi Walker during the course of the visit.  All decision-making was completed by, and the note in its entirety was reviewed by myself, Nathalie Hernandez MD.

## 2024-03-19 NOTE — PATIENT INSTRUCTIONS
Please call [phone] to schedule testing - US of abdomen and CT lung Cancer screening            Learning About Lung Cancer Screening  What is screening for lung cancer?     Lung cancer screening is a way to find some lung cancers early, before a person has any symptoms of the cancer.  Lung cancer screening may help those who have the highest risk for lung cancer--people age 50 and older who are or were heavy smokers. For most people, who aren't at increased risk, screening for lung cancer probably isn't helpful.  Screening won't prevent cancer. And it may not find all lung cancers. Lung cancer screening may lower the risk of dying from lung cancer in a small number of people.  How is it done?  Lung cancer screening is done with a low-dose CT (computed tomography) scan. A CT scan uses X-rays, or radiation, to make detailed pictures of your body. Experts recommend that screening be done in medical centers that focus on finding and treating lung cancer.  Who is screening recommended for?  Lung cancer screening is recommended for people age 50 and older who are or were heavy smokers. That means people with a smoking history of at least 20 pack years. A pack year is a way to measure how heavy a smoker you are or were.  To figure out your pack years, multiply how many packs a day on average (assuming 20 cigarettes per pack) you have smoked by how many years you have smoked. For example:  If you smoked 1 pack a day for 20 years, that's 1 times 20. So you have a smoking history of 20 pack years.  If you smoked 2 packs a day for 10 years, that's 2 times 10. So you have a smoking history of 20 pack years.  Experts agree that screening is for people who have a high risk of lung cancer. But experts don't agree on what high risk means. Some say people age 50 or older with at least a 20-pack-year smoking history are high risk. Others say it's people age 55 or older with a 30-pack-year history.  To see if you could benefit 
oral

## 2024-08-31 DIAGNOSIS — K21.9 GASTROESOPHAGEAL REFLUX DISEASE WITHOUT ESOPHAGITIS: ICD-10-CM

## 2024-09-24 ENCOUNTER — OFFICE VISIT (OUTPATIENT)
Dept: FAMILY MEDICINE CLINIC | Age: 64
End: 2024-09-24
Payer: COMMERCIAL

## 2024-09-24 VITALS
BODY MASS INDEX: 33.32 KG/M2 | SYSTOLIC BLOOD PRESSURE: 137 MMHG | OXYGEN SATURATION: 94 % | WEIGHT: 246 LBS | TEMPERATURE: 98.7 F | DIASTOLIC BLOOD PRESSURE: 82 MMHG | HEIGHT: 72 IN | HEART RATE: 63 BPM

## 2024-09-24 DIAGNOSIS — E55.9 VITAMIN D DEFICIENCY: ICD-10-CM

## 2024-09-24 DIAGNOSIS — Z12.5 SCREENING FOR PROSTATE CANCER: ICD-10-CM

## 2024-09-24 DIAGNOSIS — I10 ESSENTIAL HYPERTENSION: ICD-10-CM

## 2024-09-24 DIAGNOSIS — Z23 NEED FOR DIPHTHERIA-TETANUS-PERTUSSIS (TDAP) VACCINE: ICD-10-CM

## 2024-09-24 DIAGNOSIS — E66.09 CLASS 1 OBESITY DUE TO EXCESS CALORIES WITH SERIOUS COMORBIDITY AND BODY MASS INDEX (BMI) OF 32.0 TO 32.9 IN ADULT: ICD-10-CM

## 2024-09-24 DIAGNOSIS — I25.810 CORONARY ARTERY DISEASE INVOLVING CORONARY BYPASS GRAFT OF NATIVE HEART WITHOUT ANGINA PECTORIS: ICD-10-CM

## 2024-09-24 DIAGNOSIS — Z87.891 PERSONAL HISTORY OF TOBACCO USE: ICD-10-CM

## 2024-09-24 DIAGNOSIS — R79.89 ELEVATED TSH: ICD-10-CM

## 2024-09-24 DIAGNOSIS — R73.03 PREDIABETES: ICD-10-CM

## 2024-09-24 DIAGNOSIS — E78.00 PURE HYPERCHOLESTEROLEMIA: ICD-10-CM

## 2024-09-24 DIAGNOSIS — Z00.00 INITIAL MEDICARE ANNUAL WELLNESS VISIT: Primary | ICD-10-CM

## 2024-09-24 LAB — HBA1C MFR BLD: 5.7 %

## 2024-09-24 PROCEDURE — 3075F SYST BP GE 130 - 139MM HG: CPT | Performed by: STUDENT IN AN ORGANIZED HEALTH CARE EDUCATION/TRAINING PROGRAM

## 2024-09-24 PROCEDURE — 3079F DIAST BP 80-89 MM HG: CPT | Performed by: STUDENT IN AN ORGANIZED HEALTH CARE EDUCATION/TRAINING PROGRAM

## 2024-09-24 PROCEDURE — G0438 PPPS, INITIAL VISIT: HCPCS | Performed by: STUDENT IN AN ORGANIZED HEALTH CARE EDUCATION/TRAINING PROGRAM

## 2024-09-24 PROCEDURE — G0296 VISIT TO DETERM LDCT ELIG: HCPCS | Performed by: STUDENT IN AN ORGANIZED HEALTH CARE EDUCATION/TRAINING PROGRAM

## 2024-09-24 PROCEDURE — 1123F ACP DISCUSS/DSCN MKR DOCD: CPT | Performed by: STUDENT IN AN ORGANIZED HEALTH CARE EDUCATION/TRAINING PROGRAM

## 2024-09-24 PROCEDURE — 83036 HEMOGLOBIN GLYCOSYLATED A1C: CPT | Performed by: STUDENT IN AN ORGANIZED HEALTH CARE EDUCATION/TRAINING PROGRAM

## 2024-09-24 SDOH — ECONOMIC STABILITY: FOOD INSECURITY: WITHIN THE PAST 12 MONTHS, YOU WORRIED THAT YOUR FOOD WOULD RUN OUT BEFORE YOU GOT MONEY TO BUY MORE.: NEVER TRUE

## 2024-09-24 SDOH — ECONOMIC STABILITY: FOOD INSECURITY: WITHIN THE PAST 12 MONTHS, THE FOOD YOU BOUGHT JUST DIDN'T LAST AND YOU DIDN'T HAVE MONEY TO GET MORE.: NEVER TRUE

## 2024-09-24 SDOH — ECONOMIC STABILITY: INCOME INSECURITY: HOW HARD IS IT FOR YOU TO PAY FOR THE VERY BASICS LIKE FOOD, HOUSING, MEDICAL CARE, AND HEATING?: NOT HARD AT ALL

## 2024-09-24 ASSESSMENT — LIFESTYLE VARIABLES
HOW OFTEN DO YOU HAVE A DRINK CONTAINING ALCOHOL: 2-3 TIMES A WEEK
HOW MANY STANDARD DRINKS CONTAINING ALCOHOL DO YOU HAVE ON A TYPICAL DAY: 3 OR 4

## 2024-09-24 ASSESSMENT — PATIENT HEALTH QUESTIONNAIRE - PHQ9
SUM OF ALL RESPONSES TO PHQ QUESTIONS 1-9: 0
SUM OF ALL RESPONSES TO PHQ QUESTIONS 1-9: 0
2. FEELING DOWN, DEPRESSED OR HOPELESS: NOT AT ALL
SUM OF ALL RESPONSES TO PHQ QUESTIONS 1-9: 0
SUM OF ALL RESPONSES TO PHQ9 QUESTIONS 1 & 2: 0
SUM OF ALL RESPONSES TO PHQ QUESTIONS 1-9: 0
1. LITTLE INTEREST OR PLEASURE IN DOING THINGS: NOT AT ALL

## 2025-01-14 ENCOUNTER — OFFICE VISIT (OUTPATIENT)
Dept: FAMILY MEDICINE CLINIC | Age: 65
End: 2025-01-14

## 2025-01-14 VITALS
SYSTOLIC BLOOD PRESSURE: 120 MMHG | HEIGHT: 72 IN | HEART RATE: 63 BPM | RESPIRATION RATE: 18 BRPM | DIASTOLIC BLOOD PRESSURE: 80 MMHG | WEIGHT: 249.2 LBS | OXYGEN SATURATION: 97 % | TEMPERATURE: 98.5 F | BODY MASS INDEX: 33.75 KG/M2

## 2025-01-14 DIAGNOSIS — K08.89 PAIN, DENTAL: Primary | ICD-10-CM

## 2025-01-14 RX ORDER — PENICILLIN V POTASSIUM 500 MG/1
500 TABLET, FILM COATED ORAL 4 TIMES DAILY
Qty: 28 TABLET | Refills: 0 | Status: SHIPPED | OUTPATIENT
Start: 2025-01-14 | End: 2025-01-21

## 2025-01-14 RX ORDER — IBUPROFEN 600 MG/1
600 TABLET, FILM COATED ORAL EVERY 6 HOURS PRN
Qty: 30 TABLET | Refills: 0 | Status: SHIPPED | OUTPATIENT
Start: 2025-01-14

## 2025-01-14 NOTE — PROGRESS NOTES
25  Gray Gallegos : 1960 Sex: male  Age 64 y.o.    Subjective:  Chief Complaint   Patient presents with    Dental Pain     Left mouth pain, for 2 days, said no sleep.       HPI:   Gray Gallegos , 64 y.o. male presents to the clinic for evaluation of dental pain x 2 days. Pain is located over the left lower jaw. The patient has taken Tramadol for symptoms. The patient reports worsening symptoms over time. Pt is able to handle their own secretions and drink fluids without difficulty. Pt denies any facial edema, facial redness, sore throat, and ear pain. The patient also denies headache, fever, chest pain, abdominal pain, shortness of breath, and nausea / vomiting / diarrhea.    Onset:       Spontaneous:   yes.     Following Trauma:   no.     Previous Caries:   yes.     Recent Dental Procedure:   no.       ROS:   Unless otherwise stated in this report the patient's positive and negative responses for review of systems for constitutional, eyes, ENT, cardiovascular, respiratory, gastrointestinal, neurological, , musculoskeletal, and integument systems and related systems to the presenting problem are either stated in the history of present illness or were not pertinent or were negative for the symptoms and/or complaints related to the presenting medical problem.  Positives and pertinent negatives as per HPI.  All others reviewed and are negative.      PMH:     Past Medical History:   Diagnosis Date    CAD (coronary artery disease)     age 36     GERD (gastroesophageal reflux disease)     Hearing loss     Hyperlipidemia     Hypertension        Past Surgical History:   Procedure Laterality Date    CORONARY ARTERY BYPASS GRAFT      quadruple bypass age 36    JOINT REPLACEMENT      TOTAL HIP ARTHROPLASTY Left     mid 50s       Family History   Problem Relation Age of Onset    No Known Problems Mother     Cancer Father         unknown cancer, liver, alcoholic    Alcohol Abuse Father

## 2025-02-25 DIAGNOSIS — E78.00 PURE HYPERCHOLESTEROLEMIA: ICD-10-CM

## 2025-02-25 DIAGNOSIS — I25.810 CORONARY ARTERY DISEASE INVOLVING CORONARY BYPASS GRAFT OF NATIVE HEART WITHOUT ANGINA PECTORIS: ICD-10-CM

## 2025-02-25 RX ORDER — ROSUVASTATIN CALCIUM 10 MG/1
10 TABLET, COATED ORAL DAILY
Qty: 90 TABLET | Refills: 3 | Status: SHIPPED | OUTPATIENT
Start: 2025-02-25

## 2025-02-25 NOTE — TELEPHONE ENCOUNTER
Name of Medication(s) Requested:  Requested Prescriptions     Pending Prescriptions Disp Refills    rosuvastatin (CRESTOR) 10 MG tablet [Pharmacy Med Name: ROSUVASTATIN CALCIUM 10 MG TAB] 90 tablet 3     Sig: TAKE 1 TABLET BY MOUTH EVERY DAY       Medication is on current medication list Yes    Dosage and directions were verified? Yes    Quantity verified: 90 day supply     Pharmacy Verified?  Yes    Last Appointment:  9/24/2024    Future appts:  No future appointments.     (If no appt send self scheduling link. .REFILLAPPT)  Scheduling request sent?     [] Yes  [] No    Does patient need updated?  [] Yes  [] No

## 2025-03-28 DIAGNOSIS — L40.9 PSORIASIS: ICD-10-CM

## 2025-03-28 NOTE — TELEPHONE ENCOUNTER
Name of Medication(s) Requested:    betamethasone dipropionate 0.05 % cream       Medication is on current medication list Yes    Dosage and directions were verified? Yes    Quantity verified: 30 day supply     Pharmacy Verified?  Yes    [id number] [place] -   [address] [place] [address] -    [phone] - F [phone]       Last Appointment:  9/24/2024    Future appts:  Scheduled 4/29/25 @ 11:40am     (If no appt send self scheduling link. .REFILLAPPT)  Scheduling request sent?     [] Yes  [] No    Does patient need updated?  [] Yes  [] No

## 2025-03-29 RX ORDER — BETAMETHASONE DIPROPIONATE 0.5 MG/G
CREAM TOPICAL
Qty: 45 G | Refills: 3 | Status: SHIPPED | OUTPATIENT
Start: 2025-03-29

## 2025-04-16 ENCOUNTER — HOSPITAL ENCOUNTER (OUTPATIENT)
Age: 65
Discharge: HOME | End: 2025-04-16
Payer: COMMERCIAL

## 2025-04-16 LAB
25(OH)D3 SERPL-MCNC: 29.6 NG/ML (ref 30–100)
ALBUMIN SERPL-MCNC: 4.3 G/DL (ref 3.5–5.2)
ALP SERPL-CCNC: 60 U/L (ref 40–129)
ALT SERPL-CCNC: 16 U/L (ref 0–40)
ANION GAP SERPL CALCULATED.3IONS-SCNC: 11 MMOL/L (ref 7–16)
AST SERPL-CCNC: 18 U/L (ref 0–39)
BASOPHILS # BLD: 0.06 K/UL (ref 0–0.2)
BASOPHILS NFR BLD: 1 % (ref 0–2)
BILIRUB SERPL-MCNC: 0.5 MG/DL (ref 0–1.2)
BUN SERPL-MCNC: 16 MG/DL (ref 6–23)
CALCIUM SERPL-MCNC: 9.8 MG/DL (ref 8.6–10.2)
CHLORIDE SERPL-SCNC: 105 MMOL/L (ref 98–107)
CHOLEST SERPL-MCNC: 191 MG/DL
CO2 SERPL-SCNC: 24 MMOL/L (ref 22–29)
CREAT SERPL-MCNC: 0.8 MG/DL (ref 0.7–1.2)
EOSINOPHIL # BLD: 0.27 K/UL (ref 0.05–0.5)
EOSINOPHILS RELATIVE PERCENT: 4 % (ref 0–6)
ERYTHROCYTE [DISTWIDTH] IN BLOOD BY AUTOMATED COUNT: 13.3 % (ref 11.5–15)
GFR, ESTIMATED: >90 ML/MIN/1.73M2
GLUCOSE SERPL-MCNC: 118 MG/DL (ref 74–99)
HCT VFR BLD AUTO: 44.6 % (ref 37–54)
HDLC SERPL-MCNC: 45 MG/DL
HGB BLD-MCNC: 15.1 G/DL (ref 12.5–16.5)
IMM GRANULOCYTES # BLD AUTO: 0.04 K/UL (ref 0–0.58)
IMM GRANULOCYTES NFR BLD: 1 % (ref 0–5)
LDLC SERPL CALC-MCNC: 132 MG/DL
LYMPHOCYTES NFR BLD: 1.39 K/UL (ref 1.5–4)
LYMPHOCYTES RELATIVE PERCENT: 21 % (ref 20–42)
MCH RBC QN AUTO: 31.3 PG (ref 26–35)
MCHC RBC AUTO-ENTMCNC: 33.9 G/DL (ref 32–34.5)
MCV RBC AUTO: 92.3 FL (ref 80–99.9)
MONOCYTES NFR BLD: 0.44 K/UL (ref 0.1–0.95)
MONOCYTES NFR BLD: 7 % (ref 2–12)
NEUTROPHILS NFR BLD: 67 % (ref 43–80)
NEUTS SEG NFR BLD: 4.55 K/UL (ref 1.8–7.3)
PLATELET # BLD AUTO: 295 K/UL (ref 130–450)
PMV BLD AUTO: 8.9 FL (ref 7–12)
POTASSIUM SERPL-SCNC: 4.4 MMOL/L (ref 3.5–5)
PROT SERPL-MCNC: 7.5 G/DL (ref 6.4–8.3)
PSA SERPL-MCNC: 0.39 NG/ML (ref 0–4)
RBC # BLD AUTO: 4.83 M/UL (ref 3.8–5.8)
SODIUM SERPL-SCNC: 140 MMOL/L (ref 132–146)
TRIGL SERPL-MCNC: 73 MG/DL
TSH SERPL DL<=0.05 MIU/L-ACNC: 7.33 UIU/ML (ref 0.27–4.2)
VLDLC SERPL CALC-MCNC: 15 MG/DL
WBC OTHER # BLD: 6.8 K/UL (ref 4.5–11.5)

## 2025-04-16 PROCEDURE — 80061 LIPID PANEL: CPT

## 2025-04-16 PROCEDURE — G0103 PSA SCREENING: HCPCS

## 2025-04-16 PROCEDURE — 36415 COLL VENOUS BLD VENIPUNCTURE: CPT

## 2025-04-16 PROCEDURE — 82306 VITAMIN D 25 HYDROXY: CPT

## 2025-04-16 PROCEDURE — 85025 COMPLETE CBC W/AUTO DIFF WBC: CPT

## 2025-04-16 PROCEDURE — 80053 COMPREHEN METABOLIC PANEL: CPT

## 2025-04-16 PROCEDURE — 84443 ASSAY THYROID STIM HORMONE: CPT

## 2025-04-28 SDOH — ECONOMIC STABILITY: FOOD INSECURITY: WITHIN THE PAST 12 MONTHS, THE FOOD YOU BOUGHT JUST DIDN'T LAST AND YOU DIDN'T HAVE MONEY TO GET MORE.: NEVER TRUE

## 2025-04-28 SDOH — ECONOMIC STABILITY: FOOD INSECURITY: WITHIN THE PAST 12 MONTHS, YOU WORRIED THAT YOUR FOOD WOULD RUN OUT BEFORE YOU GOT MONEY TO BUY MORE.: NEVER TRUE

## 2025-04-28 SDOH — ECONOMIC STABILITY: INCOME INSECURITY: IN THE LAST 12 MONTHS, WAS THERE A TIME WHEN YOU WERE NOT ABLE TO PAY THE MORTGAGE OR RENT ON TIME?: PATIENT DECLINED

## 2025-04-28 ASSESSMENT — PATIENT HEALTH QUESTIONNAIRE - PHQ9
1. LITTLE INTEREST OR PLEASURE IN DOING THINGS: NOT AT ALL
SUM OF ALL RESPONSES TO PHQ QUESTIONS 1-9: 0
SUM OF ALL RESPONSES TO PHQ QUESTIONS 1-9: 0
2. FEELING DOWN, DEPRESSED OR HOPELESS: NOT AT ALL
2. FEELING DOWN, DEPRESSED OR HOPELESS: NOT AT ALL
SUM OF ALL RESPONSES TO PHQ QUESTIONS 1-9: 0
SUM OF ALL RESPONSES TO PHQ QUESTIONS 1-9: 0
1. LITTLE INTEREST OR PLEASURE IN DOING THINGS: NOT AT ALL
SUM OF ALL RESPONSES TO PHQ9 QUESTIONS 1 & 2: 0

## 2025-04-29 ENCOUNTER — OFFICE VISIT (OUTPATIENT)
Dept: FAMILY MEDICINE CLINIC | Age: 65
End: 2025-04-29
Payer: COMMERCIAL

## 2025-04-29 VITALS
TEMPERATURE: 97.6 F | HEIGHT: 72 IN | OXYGEN SATURATION: 95 % | HEART RATE: 69 BPM | RESPIRATION RATE: 17 BRPM | SYSTOLIC BLOOD PRESSURE: 128 MMHG | BODY MASS INDEX: 34.54 KG/M2 | DIASTOLIC BLOOD PRESSURE: 79 MMHG | WEIGHT: 255 LBS

## 2025-04-29 DIAGNOSIS — R73.03 PREDIABETES: Primary | ICD-10-CM

## 2025-04-29 DIAGNOSIS — J45.20 MILD INTERMITTENT EXTRINSIC ASTHMA WITHOUT COMPLICATION: ICD-10-CM

## 2025-04-29 DIAGNOSIS — Z95.1 HISTORY OF CORONARY ARTERY BYPASS GRAFT: ICD-10-CM

## 2025-04-29 DIAGNOSIS — R79.89 ELEVATED TSH: ICD-10-CM

## 2025-04-29 DIAGNOSIS — Z13.6 SCREENING FOR AAA (ABDOMINAL AORTIC ANEURYSM): ICD-10-CM

## 2025-04-29 DIAGNOSIS — Z87.891 PERSONAL HISTORY OF TOBACCO USE: ICD-10-CM

## 2025-04-29 DIAGNOSIS — E66.09 CLASS 1 OBESITY DUE TO EXCESS CALORIES WITH SERIOUS COMORBIDITY AND BODY MASS INDEX (BMI) OF 32.0 TO 32.9 IN ADULT: ICD-10-CM

## 2025-04-29 DIAGNOSIS — I25.810 CORONARY ARTERY DISEASE INVOLVING CORONARY BYPASS GRAFT OF NATIVE HEART WITHOUT ANGINA PECTORIS: ICD-10-CM

## 2025-04-29 DIAGNOSIS — R73.03 PRE-DIABETES: ICD-10-CM

## 2025-04-29 DIAGNOSIS — E78.00 PURE HYPERCHOLESTEROLEMIA: ICD-10-CM

## 2025-04-29 DIAGNOSIS — I10 ESSENTIAL HYPERTENSION: ICD-10-CM

## 2025-04-29 DIAGNOSIS — E66.811 CLASS 1 OBESITY DUE TO EXCESS CALORIES WITH SERIOUS COMORBIDITY AND BODY MASS INDEX (BMI) OF 32.0 TO 32.9 IN ADULT: ICD-10-CM

## 2025-04-29 DIAGNOSIS — Z53.20 LUNG CANCER SCREENING DECLINED BY PATIENT: ICD-10-CM

## 2025-04-29 DIAGNOSIS — Z28.21 REFUSES TETANUS, DIPHTHERIA, AND ACELLULAR PERTUSSIS (TDAP) VACCINATION: ICD-10-CM

## 2025-04-29 LAB — HBA1C MFR BLD: 6.1 %

## 2025-04-29 PROCEDURE — 99214 OFFICE O/P EST MOD 30 MIN: CPT | Performed by: STUDENT IN AN ORGANIZED HEALTH CARE EDUCATION/TRAINING PROGRAM

## 2025-04-29 PROCEDURE — 1123F ACP DISCUSS/DSCN MKR DOCD: CPT | Performed by: STUDENT IN AN ORGANIZED HEALTH CARE EDUCATION/TRAINING PROGRAM

## 2025-04-29 PROCEDURE — 3078F DIAST BP <80 MM HG: CPT | Performed by: STUDENT IN AN ORGANIZED HEALTH CARE EDUCATION/TRAINING PROGRAM

## 2025-04-29 PROCEDURE — 83036 HEMOGLOBIN GLYCOSYLATED A1C: CPT | Performed by: STUDENT IN AN ORGANIZED HEALTH CARE EDUCATION/TRAINING PROGRAM

## 2025-04-29 PROCEDURE — 3074F SYST BP LT 130 MM HG: CPT | Performed by: STUDENT IN AN ORGANIZED HEALTH CARE EDUCATION/TRAINING PROGRAM

## 2025-04-29 RX ORDER — ROSUVASTATIN CALCIUM 20 MG/1
20 TABLET, COATED ORAL DAILY
Qty: 90 TABLET | Refills: 3
Start: 2025-04-29

## 2025-04-29 RX ORDER — LOSARTAN POTASSIUM 50 MG/1
50 TABLET ORAL DAILY
Qty: 90 TABLET | Refills: 3 | Status: SHIPPED | OUTPATIENT
Start: 2025-04-29

## 2025-04-29 ASSESSMENT — LIFESTYLE VARIABLES
HOW OFTEN DO YOU HAVE A DRINK CONTAINING ALCOHOL: 4 OR MORE TIMES A WEEK
HOW MANY STANDARD DRINKS CONTAINING ALCOHOL DO YOU HAVE ON A TYPICAL DAY: 1 OR 2

## 2025-04-29 ASSESSMENT — ENCOUNTER SYMPTOMS
SHORTNESS OF BREATH: 0
ABDOMINAL PAIN: 0
TROUBLE SWALLOWING: 0

## 2025-04-29 NOTE — PROGRESS NOTES
Gray Gallegos (:  1960) is a 64 y.o. male, established patient follow up , here for evaluation of the following:  Hypertension and Hand Injury (3 weeks, lump on thumb, has gotten bigger)       The patient (or guardian, if applicable) and other individuals in attendance with the patient were advised that Artificial Intelligence will be utilized during this visit to record, process the conversation to generate a clinical note, and support improvement of the AI technology. The patient (or guardian, if applicable) and other individuals in attendance at the appointment consented to the use of AI, including the recording.                    Assessment & Plan   ASSESSMENT/PLAN  Assessment & Plan  1. Health maintenance.  - Due for lung cancer screening and abdominal aortic aneurysm screening. These are odered but he does not think he will get them done   - Declined tetanus vaccine at this time.  - Advised to monitor hand cyst for any changes.  - No current concerns regarding hearing, memory, or hydration.    2. Hand cyst.  - Likely benign, given its squishy, round, and mobile nature.  - Located on the tendon right lateral thumb and may resolve spontaneously.  - Advised to monitor the cyst for any changes.  - Discussed that draining may result in recurrence, and surgical removal is not typically recommended unless it becomes bothersome.    3. Hypothyroidism.  - TSH level slightly elevated at 7.33.  - Family history of thyroid problems on mother's side.  - Symptoms of underactive thyroid, such as constipation, low heart rate, fatigue, and weight gain, were discussed.  - Prefers not to start medication at this time; thyroid function will be rechecked yearly.    4. Hyperlipidemia.  - LDL level remains elevated at 132 despite being on rosuvastatin 10 mg daily.  - History of coronary artery disease and bypass surgery warrants a more aggressive approach.  - Dosage of rosuvastatin will be increased to 20 mg, to be

## 2025-07-11 ENCOUNTER — TELEPHONE (OUTPATIENT)
Dept: FAMILY MEDICINE CLINIC | Age: 65
End: 2025-07-11

## 2025-07-11 NOTE — TELEPHONE ENCOUNTER
Patient would like Dr to write a letter excusing him from jury duty. Please call wife when done [phone].

## 2025-07-11 NOTE — TELEPHONE ENCOUNTER
Please write a note that says the following    To whom it may concern    Patient name, does have multiple medical conditions that make it hard for him to drive to and participate in jury duty.  Please excuse him from participation.    Thank you for your consideration    My name in our office information

## 2025-07-18 ENCOUNTER — HOSPITAL ENCOUNTER (OUTPATIENT)
Dept: RADIOLOGY | Facility: HOSPITAL | Age: 65
Discharge: HOME | End: 2025-07-18
Payer: COMMERCIAL

## 2025-07-18 DIAGNOSIS — D38.0 NEOPLASM OF UNCERTAIN BEHAVIOR OF LARYNX: ICD-10-CM

## 2025-07-18 PROCEDURE — 2550000001 HC RX 255 CONTRASTS: Performed by: OTOLARYNGOLOGY

## 2025-07-18 PROCEDURE — 70491 CT SOFT TISSUE NECK W/DYE: CPT

## 2025-07-18 RX ADMIN — IOHEXOL 75 ML: 350 INJECTION, SOLUTION INTRAVENOUS at 14:41

## 2025-08-11 ENCOUNTER — TELEPHONE (OUTPATIENT)
Dept: OTOLARYNGOLOGY | Facility: HOSPITAL | Age: 65
End: 2025-08-11

## 2025-08-11 ENCOUNTER — OFFICE VISIT (OUTPATIENT)
Dept: OTOLARYNGOLOGY | Facility: HOSPITAL | Age: 65
End: 2025-08-11
Payer: COMMERCIAL

## 2025-08-11 VITALS — BODY MASS INDEX: 35.08 KG/M2 | WEIGHT: 259 LBS | HEIGHT: 72 IN | TEMPERATURE: 96.8 F

## 2025-08-11 DIAGNOSIS — C32.9 MALIGNANT NEOPLASM OF LARYNX: ICD-10-CM

## 2025-08-11 PROCEDURE — 31575 DIAGNOSTIC LARYNGOSCOPY: CPT | Mod: 25 | Performed by: OTOLARYNGOLOGY

## 2025-08-11 PROCEDURE — 1159F MED LIST DOCD IN RCRD: CPT | Performed by: OTOLARYNGOLOGY

## 2025-08-11 PROCEDURE — 1160F RVW MEDS BY RX/DR IN RCRD: CPT | Performed by: OTOLARYNGOLOGY

## 2025-08-11 PROCEDURE — 99203 OFFICE O/P NEW LOW 30 MIN: CPT | Performed by: OTOLARYNGOLOGY

## 2025-08-11 PROCEDURE — 31575 DIAGNOSTIC LARYNGOSCOPY: CPT | Performed by: OTOLARYNGOLOGY

## 2025-08-11 PROCEDURE — 99213 OFFICE O/P EST LOW 20 MIN: CPT | Mod: 25 | Performed by: OTOLARYNGOLOGY

## 2025-08-11 PROCEDURE — 3008F BODY MASS INDEX DOCD: CPT | Performed by: OTOLARYNGOLOGY

## 2025-08-11 RX ORDER — ATORVASTATIN CALCIUM 20 MG/1
20 TABLET, FILM COATED ORAL DAILY
COMMUNITY

## 2025-08-11 RX ORDER — LOSARTAN POTASSIUM 25 MG/1
25 TABLET ORAL DAILY
COMMUNITY

## 2025-08-11 ASSESSMENT — PATIENT HEALTH QUESTIONNAIRE - PHQ9
SUM OF ALL RESPONSES TO PHQ9 QUESTIONS 1 AND 2: 0
2. FEELING DOWN, DEPRESSED OR HOPELESS: NOT AT ALL
1. LITTLE INTEREST OR PLEASURE IN DOING THINGS: NOT AT ALL

## 2025-08-14 ENCOUNTER — HOSPITAL ENCOUNTER (OUTPATIENT)
Dept: RADIOLOGY | Facility: HOSPITAL | Age: 65
Discharge: HOME | End: 2025-08-14
Payer: COMMERCIAL

## 2025-08-14 ENCOUNTER — PRE-ADMISSION TESTING (OUTPATIENT)
Dept: PREADMISSION TESTING | Facility: HOSPITAL | Age: 65
End: 2025-08-14
Payer: COMMERCIAL

## 2025-08-14 VITALS
TEMPERATURE: 97.7 F | SYSTOLIC BLOOD PRESSURE: 151 MMHG | HEART RATE: 64 BPM | DIASTOLIC BLOOD PRESSURE: 87 MMHG | OXYGEN SATURATION: 93 %

## 2025-08-14 DIAGNOSIS — C32.9 MALIGNANT NEOPLASM OF LARYNX: ICD-10-CM

## 2025-08-14 DIAGNOSIS — Z41.9 SURGERY, ELECTIVE: Primary | ICD-10-CM

## 2025-08-14 LAB
ANION GAP SERPL CALC-SCNC: 14 MMOL/L (ref 10–20)
BUN SERPL-MCNC: 15 MG/DL (ref 6–23)
CALCIUM SERPL-MCNC: 9.6 MG/DL (ref 8.6–10.6)
CHLORIDE SERPL-SCNC: 104 MMOL/L (ref 98–107)
CO2 SERPL-SCNC: 26 MMOL/L (ref 21–32)
CREAT SERPL-MCNC: 0.8 MG/DL (ref 0.5–1.3)
EGFRCR SERPLBLD CKD-EPI 2021: >90 ML/MIN/1.73M*2
ERYTHROCYTE [DISTWIDTH] IN BLOOD BY AUTOMATED COUNT: 13.1 % (ref 11.5–14.5)
GLUCOSE SERPL-MCNC: 106 MG/DL (ref 74–99)
HCT VFR BLD AUTO: 42.6 % (ref 41–52)
HGB BLD-MCNC: 13.6 G/DL (ref 13.5–17.5)
MCH RBC QN AUTO: 30.8 PG (ref 26–34)
MCHC RBC AUTO-ENTMCNC: 31.9 G/DL (ref 32–36)
MCV RBC AUTO: 96 FL (ref 80–100)
NRBC BLD-RTO: 0 /100 WBCS (ref 0–0)
PLATELET # BLD AUTO: 294 X10*3/UL (ref 150–450)
POTASSIUM SERPL-SCNC: 4.6 MMOL/L (ref 3.5–5.3)
RBC # BLD AUTO: 4.42 X10*6/UL (ref 4.5–5.9)
SODIUM SERPL-SCNC: 139 MMOL/L (ref 136–145)
WBC # BLD AUTO: 6.5 X10*3/UL (ref 4.4–11.3)

## 2025-08-14 PROCEDURE — 80048 BASIC METABOLIC PNL TOTAL CA: CPT

## 2025-08-14 PROCEDURE — 71250 CT THORAX DX C-: CPT

## 2025-08-14 PROCEDURE — 85027 COMPLETE CBC AUTOMATED: CPT

## 2025-08-14 PROCEDURE — 99204 OFFICE O/P NEW MOD 45 MIN: CPT | Performed by: NURSE ANESTHETIST, CERTIFIED REGISTERED

## 2025-08-14 PROCEDURE — 36415 COLL VENOUS BLD VENIPUNCTURE: CPT

## 2025-08-14 PROCEDURE — 71250 CT THORAX DX C-: CPT | Performed by: RADIOLOGY

## 2025-08-14 ASSESSMENT — DUKE ACTIVITY SCORE INDEX (DASI)
CAN YOU DO LIGHT WORK AROUND THE HOUSE LIKE DUSTING OR WASHING DISHES: YES
DASI METS SCORE: 9
CAN YOU WALK A BLOCK OR TWO ON LEVEL GROUND: YES
CAN YOU DO MODERATE WORK AROUND THE HOUSE LIKE VACUUMING, SWEEPING FLOORS OR CARRYING GROCERIES: YES
CAN YOU CLIMB A FLIGHT OF STAIRS OR WALK UP A HILL: YES
CAN YOU PARTICIPATE IN STRENOUS SPORTS LIKE SWIMMING, SINGLES TENNIS, FOOTBALL, BASKETBALL, OR SKIING: NO
CAN YOU DO HEAVY WORK AROUND THE HOUSE LIKE SCRUBBING FLOORS OR LIFTING AND MOVING HEAVY FURNITURE: YES
CAN YOU HAVE SEXUAL RELATIONS: YES
CAN YOU WALK INDOORS, SUCH AS AROUND YOUR HOUSE: YES
CAN YOU RUN A SHORT DISTANCE: YES
CAN YOU DO YARD WORK LIKE RAKING LEAVES, WEEDING OR PUSHING A MOWER: YES
CAN YOU TAKE CARE OF YOURSELF (EAT, DRESS, BATHE, OR USE TOILET): YES
TOTAL_SCORE: 50.7
CAN YOU PARTICIPATE IN MODERATE RECREATIONAL ACTIVITIES LIKE GOLF, BOWLING, DANCING, DOUBLES TENNIS OR THROWING A BASEBALL OR FOOTBALL: YES

## 2025-08-14 ASSESSMENT — ENCOUNTER SYMPTOMS
NEUROLOGICAL NEGATIVE: 1
CONSTITUTIONAL NEGATIVE: 1
CARDIOVASCULAR NEGATIVE: 1
MUSCULOSKELETAL NEGATIVE: 1
GASTROINTESTINAL NEGATIVE: 1
ENDOCRINE NEGATIVE: 1
VOICE CHANGE: 1
RESPIRATORY NEGATIVE: 1
NECK NEGATIVE: 1

## 2025-08-18 ENCOUNTER — ANESTHESIA EVENT (OUTPATIENT)
Dept: OPERATING ROOM | Facility: HOSPITAL | Age: 65
End: 2025-08-18
Payer: COMMERCIAL

## 2025-08-19 ENCOUNTER — HOSPITAL ENCOUNTER (OUTPATIENT)
Facility: HOSPITAL | Age: 65
Setting detail: OUTPATIENT SURGERY
Discharge: HOME | End: 2025-08-19
Attending: OTOLARYNGOLOGY | Admitting: OTOLARYNGOLOGY
Payer: COMMERCIAL

## 2025-08-19 ENCOUNTER — ANESTHESIA (OUTPATIENT)
Dept: OPERATING ROOM | Facility: HOSPITAL | Age: 65
End: 2025-08-19
Payer: COMMERCIAL

## 2025-08-19 VITALS
HEIGHT: 72 IN | DIASTOLIC BLOOD PRESSURE: 81 MMHG | BODY MASS INDEX: 35.21 KG/M2 | WEIGHT: 260 LBS | RESPIRATION RATE: 16 BRPM | HEART RATE: 62 BPM | SYSTOLIC BLOOD PRESSURE: 148 MMHG | TEMPERATURE: 97 F | OXYGEN SATURATION: 95 %

## 2025-08-19 DIAGNOSIS — C32.9 MALIGNANT NEOPLASM OF LARYNX: Primary | ICD-10-CM

## 2025-08-19 DIAGNOSIS — C32.9 MALIGNANT NEOPLASM OF LARYNX: ICD-10-CM

## 2025-08-19 PROBLEM — I10 HTN (HYPERTENSION): Status: ACTIVE | Noted: 2025-08-19

## 2025-08-19 PROBLEM — I25.10 CAD (CORONARY ARTERY DISEASE): Status: ACTIVE | Noted: 2025-08-19

## 2025-08-19 PROBLEM — E78.5 HYPERLIPIDEMIA: Status: ACTIVE | Noted: 2025-08-19

## 2025-08-19 PROCEDURE — 88305 TISSUE EXAM BY PATHOLOGIST: CPT | Mod: TC,SUR | Performed by: OTOLARYNGOLOGY

## 2025-08-19 PROCEDURE — 3600000003 HC OR TIME - INITIAL BASE CHARGE - PROCEDURE LEVEL THREE: Performed by: OTOLARYNGOLOGY

## 2025-08-19 PROCEDURE — 7100000001 HC RECOVERY ROOM TIME - INITIAL BASE CHARGE: Performed by: OTOLARYNGOLOGY

## 2025-08-19 PROCEDURE — 7100000002 HC RECOVERY ROOM TIME - EACH INCREMENTAL 1 MINUTE: Performed by: OTOLARYNGOLOGY

## 2025-08-19 PROCEDURE — 7100000010 HC PHASE TWO TIME - EACH INCREMENTAL 1 MINUTE: Performed by: OTOLARYNGOLOGY

## 2025-08-19 PROCEDURE — 7100000009 HC PHASE TWO TIME - INITIAL BASE CHARGE: Performed by: OTOLARYNGOLOGY

## 2025-08-19 PROCEDURE — 2500000004 HC RX 250 GENERAL PHARMACY W/ HCPCS (ALT 636 FOR OP/ED)

## 2025-08-19 PROCEDURE — 2500000005 HC RX 250 GENERAL PHARMACY W/O HCPCS: Performed by: OTOLARYNGOLOGY

## 2025-08-19 PROCEDURE — 3700000002 HC GENERAL ANESTHESIA TIME - EACH INCREMENTAL 1 MINUTE: Performed by: OTOLARYNGOLOGY

## 2025-08-19 PROCEDURE — 3700000001 HC GENERAL ANESTHESIA TIME - INITIAL BASE CHARGE: Performed by: OTOLARYNGOLOGY

## 2025-08-19 PROCEDURE — 3600000008 HC OR TIME - EACH INCREMENTAL 1 MINUTE - PROCEDURE LEVEL THREE: Performed by: OTOLARYNGOLOGY

## 2025-08-19 RX ORDER — ONDANSETRON HYDROCHLORIDE 2 MG/ML
4 INJECTION, SOLUTION INTRAVENOUS ONCE AS NEEDED
Status: DISCONTINUED | OUTPATIENT
Start: 2025-08-19 | End: 2025-08-19 | Stop reason: HOSPADM

## 2025-08-19 RX ORDER — ROCURONIUM BROMIDE 10 MG/ML
INJECTION, SOLUTION INTRAVENOUS AS NEEDED
Status: DISCONTINUED | OUTPATIENT
Start: 2025-08-19 | End: 2025-08-19

## 2025-08-19 RX ORDER — MIDAZOLAM HYDROCHLORIDE 2 MG/2ML
INJECTION, SOLUTION INTRAMUSCULAR; INTRAVENOUS AS NEEDED
Status: DISCONTINUED | OUTPATIENT
Start: 2025-08-19 | End: 2025-08-19

## 2025-08-19 RX ORDER — OXYCODONE HYDROCHLORIDE 5 MG/1
5 TABLET ORAL EVERY 6 HOURS PRN
Qty: 8 TABLET | Refills: 0 | Status: SHIPPED | OUTPATIENT
Start: 2025-08-19 | End: 2025-08-19 | Stop reason: HOSPADM

## 2025-08-19 RX ORDER — WATER 1 ML/ML
INJECTION IRRIGATION AS NEEDED
Status: DISCONTINUED | OUTPATIENT
Start: 2025-08-19 | End: 2025-08-19 | Stop reason: HOSPADM

## 2025-08-19 RX ORDER — ONDANSETRON HYDROCHLORIDE 2 MG/ML
INJECTION, SOLUTION INTRAVENOUS AS NEEDED
Status: DISCONTINUED | OUTPATIENT
Start: 2025-08-19 | End: 2025-08-19

## 2025-08-19 RX ORDER — PROPOFOL 10 MG/ML
INJECTION, EMULSION INTRAVENOUS AS NEEDED
Status: DISCONTINUED | OUTPATIENT
Start: 2025-08-19 | End: 2025-08-19

## 2025-08-19 RX ORDER — FENTANYL CITRATE 50 UG/ML
INJECTION, SOLUTION INTRAMUSCULAR; INTRAVENOUS AS NEEDED
Status: DISCONTINUED | OUTPATIENT
Start: 2025-08-19 | End: 2025-08-19

## 2025-08-19 RX ORDER — METOCLOPRAMIDE HYDROCHLORIDE 5 MG/ML
10 INJECTION INTRAMUSCULAR; INTRAVENOUS ONCE AS NEEDED
Status: DISCONTINUED | OUTPATIENT
Start: 2025-08-19 | End: 2025-08-19 | Stop reason: HOSPADM

## 2025-08-19 RX ORDER — CEFAZOLIN 1 G/1
INJECTION, POWDER, FOR SOLUTION INTRAVENOUS AS NEEDED
Status: DISCONTINUED | OUTPATIENT
Start: 2025-08-19 | End: 2025-08-19

## 2025-08-19 RX ORDER — SODIUM CHLORIDE, SODIUM LACTATE, POTASSIUM CHLORIDE, CALCIUM CHLORIDE 600; 310; 30; 20 MG/100ML; MG/100ML; MG/100ML; MG/100ML
100 INJECTION, SOLUTION INTRAVENOUS CONTINUOUS
Status: DISCONTINUED | OUTPATIENT
Start: 2025-08-19 | End: 2025-08-19 | Stop reason: HOSPADM

## 2025-08-19 RX ORDER — POLYETHYLENE GLYCOL 3350 17 G/17G
17 POWDER, FOR SOLUTION ORAL DAILY
Qty: 7 PACKET | Refills: 0 | Status: SHIPPED | OUTPATIENT
Start: 2025-08-19 | End: 2025-08-19 | Stop reason: HOSPADM

## 2025-08-19 RX ORDER — FENTANYL CITRATE 50 UG/ML
50 INJECTION, SOLUTION INTRAMUSCULAR; INTRAVENOUS EVERY 5 MIN PRN
Status: DISCONTINUED | OUTPATIENT
Start: 2025-08-19 | End: 2025-08-19 | Stop reason: HOSPADM

## 2025-08-19 RX ORDER — HYDROMORPHONE HYDROCHLORIDE 0.2 MG/ML
0.2 INJECTION INTRAMUSCULAR; INTRAVENOUS; SUBCUTANEOUS EVERY 5 MIN PRN
Status: DISCONTINUED | OUTPATIENT
Start: 2025-08-19 | End: 2025-08-19 | Stop reason: HOSPADM

## 2025-08-19 RX ORDER — ONDANSETRON 4 MG/1
4 TABLET, ORALLY DISINTEGRATING ORAL EVERY 6 HOURS PRN
Qty: 20 TABLET | Refills: 0 | Status: SHIPPED | OUTPATIENT
Start: 2025-08-19 | End: 2025-08-24

## 2025-08-19 RX ORDER — LIDOCAINE HYDROCHLORIDE 10 MG/ML
0.1 INJECTION, SOLUTION INFILTRATION; PERINEURAL ONCE
Status: DISCONTINUED | OUTPATIENT
Start: 2025-08-19 | End: 2025-08-19 | Stop reason: HOSPADM

## 2025-08-19 RX ORDER — LIDOCAINE HCL/PF 100 MG/5ML
SYRINGE (ML) INTRAVENOUS AS NEEDED
Status: DISCONTINUED | OUTPATIENT
Start: 2025-08-19 | End: 2025-08-19

## 2025-08-19 RX ORDER — OXYCODONE HYDROCHLORIDE 5 MG/1
5 TABLET ORAL EVERY 4 HOURS PRN
Status: DISCONTINUED | OUTPATIENT
Start: 2025-08-19 | End: 2025-08-19 | Stop reason: HOSPADM

## 2025-08-19 RX ADMIN — FENTANYL CITRATE 100 MCG: 50 INJECTION, SOLUTION INTRAMUSCULAR; INTRAVENOUS at 07:30

## 2025-08-19 RX ADMIN — ROCURONIUM BROMIDE 60 MG: 10 INJECTION INTRAVENOUS at 07:30

## 2025-08-19 RX ADMIN — DEXAMETHASONE SODIUM PHOSPHATE 4 MG: 4 INJECTION INTRA-ARTICULAR; INTRALESIONAL; INTRAMUSCULAR; INTRAVENOUS; SOFT TISSUE at 07:31

## 2025-08-19 RX ADMIN — PROPOFOL 160 MG: 10 INJECTION, EMULSION INTRAVENOUS at 07:30

## 2025-08-19 RX ADMIN — ONDANSETRON 4 MG: 2 INJECTION INTRAMUSCULAR; INTRAVENOUS at 07:53

## 2025-08-19 RX ADMIN — SODIUM CHLORIDE, SODIUM LACTATE, POTASSIUM CHLORIDE, AND CALCIUM CHLORIDE: 600; 310; 30; 20 INJECTION, SOLUTION INTRAVENOUS at 07:17

## 2025-08-19 RX ADMIN — CEFAZOLIN 2 G: 1 INJECTION, POWDER, FOR SOLUTION INTRAMUSCULAR; INTRAVENOUS at 07:31

## 2025-08-19 RX ADMIN — LIDOCAINE HYDROCHLORIDE 100 MG: 20 INJECTION INTRAVENOUS at 07:30

## 2025-08-19 RX ADMIN — SUGAMMADEX 300 MG: 100 INJECTION, SOLUTION INTRAVENOUS at 08:07

## 2025-08-19 RX ADMIN — Medication: at 08:21

## 2025-08-19 RX ADMIN — MIDAZOLAM HYDROCHLORIDE 2 MG: 2 INJECTION, SOLUTION INTRAMUSCULAR; INTRAVENOUS at 07:17

## 2025-08-19 RX ADMIN — PROPOFOL 40 MG: 10 INJECTION, EMULSION INTRAVENOUS at 07:40

## 2025-08-19 SDOH — HEALTH STABILITY: MENTAL HEALTH: CURRENT SMOKER: 0

## 2025-08-19 ASSESSMENT — PAIN SCALES - GENERAL
PAINLEVEL_OUTOF10: 0 - NO PAIN

## 2025-08-19 ASSESSMENT — PAIN - FUNCTIONAL ASSESSMENT
PAIN_FUNCTIONAL_ASSESSMENT: 0-10

## 2025-08-22 ENCOUNTER — TELEPHONE (OUTPATIENT)
Dept: OTOLARYNGOLOGY | Facility: HOSPITAL | Age: 65
End: 2025-08-22
Payer: COMMERCIAL

## 2025-08-22 LAB
LABORATORY COMMENT REPORT: NORMAL
Lab: NORMAL
PATH REPORT.FINAL DX SPEC: NORMAL
PATH REPORT.GROSS SPEC: NORMAL
PATH REPORT.RELEVANT HX SPEC: NORMAL
PATH REPORT.TOTAL CANCER: NORMAL

## 2025-08-25 ENCOUNTER — APPOINTMENT (OUTPATIENT)
Dept: OTOLARYNGOLOGY | Facility: HOSPITAL | Age: 65
End: 2025-08-25
Payer: COMMERCIAL

## 2025-08-25 ENCOUNTER — OFFICE VISIT (OUTPATIENT)
Dept: OTOLARYNGOLOGY | Facility: HOSPITAL | Age: 65
End: 2025-08-25
Payer: COMMERCIAL

## 2025-08-25 VITALS — WEIGHT: 259 LBS | BODY MASS INDEX: 35.13 KG/M2

## 2025-08-25 DIAGNOSIS — C32.9 MALIGNANT NEOPLASM OF LARYNX: Primary | ICD-10-CM

## 2025-08-25 PROCEDURE — 1159F MED LIST DOCD IN RCRD: CPT | Performed by: OTOLARYNGOLOGY

## 2025-08-25 PROCEDURE — 99211 OFF/OP EST MAY X REQ PHY/QHP: CPT | Performed by: OTOLARYNGOLOGY

## 2025-08-25 PROCEDURE — 1160F RVW MEDS BY RX/DR IN RCRD: CPT | Performed by: OTOLARYNGOLOGY

## 2025-08-25 PROCEDURE — 1036F TOBACCO NON-USER: CPT | Performed by: OTOLARYNGOLOGY

## 2025-08-25 ASSESSMENT — PATIENT HEALTH QUESTIONNAIRE - PHQ9
SUM OF ALL RESPONSES TO PHQ QUESTIONS 1-9: 2
5. POOR APPETITE OR OVEREATING: NOT AT ALL
4. FEELING TIRED OR HAVING LITTLE ENERGY: NOT AT ALL
SUM OF ALL RESPONSES TO PHQ9 QUESTIONS 1 AND 2: 2
8. MOVING OR SPEAKING SO SLOWLY THAT OTHER PEOPLE COULD HAVE NOTICED. OR THE OPPOSITE, BEING SO FIGETY OR RESTLESS THAT YOU HAVE BEEN MOVING AROUND A LOT MORE THAN USUAL: NOT AT ALL
7. TROUBLE CONCENTRATING ON THINGS, SUCH AS READING THE NEWSPAPER OR WATCHING TELEVISION: NOT AT ALL
9. THOUGHTS THAT YOU WOULD BE BETTER OFF DEAD, OR OF HURTING YOURSELF: NOT AT ALL
3. TROUBLE FALLING OR STAYING ASLEEP OR SLEEPING TOO MUCH: NOT AT ALL
1. LITTLE INTEREST OR PLEASURE IN DOING THINGS: NOT AT ALL
2. FEELING DOWN, DEPRESSED OR HOPELESS: MORE THAN HALF THE DAYS
6. FEELING BAD ABOUT YOURSELF - OR THAT YOU ARE A FAILURE OR HAVE LET YOURSELF OR YOUR FAMILY DOWN: NOT AT ALL

## 2025-08-27 ENCOUNTER — APPOINTMENT (OUTPATIENT)
Dept: PREADMISSION TESTING | Facility: HOSPITAL | Age: 65
End: 2025-08-27
Payer: COMMERCIAL

## 2025-08-29 ENCOUNTER — HOSPITAL ENCOUNTER (OUTPATIENT)
Dept: RADIATION ONCOLOGY | Facility: CLINIC | Age: 65
Setting detail: RADIATION/ONCOLOGY SERIES
Discharge: HOME | End: 2025-08-29
Payer: COMMERCIAL

## 2025-08-29 VITALS
DIASTOLIC BLOOD PRESSURE: 83 MMHG | TEMPERATURE: 98.2 F | BODY MASS INDEX: 35.32 KG/M2 | OXYGEN SATURATION: 94 % | RESPIRATION RATE: 16 BRPM | SYSTOLIC BLOOD PRESSURE: 141 MMHG | HEART RATE: 60 BPM | WEIGHT: 260.4 LBS

## 2025-08-29 DIAGNOSIS — C32.9 MALIGNANT NEOPLASM OF LARYNX: Primary | ICD-10-CM

## 2025-08-29 PROCEDURE — 99417 PROLNG OP E/M EACH 15 MIN: CPT | Performed by: STUDENT IN AN ORGANIZED HEALTH CARE EDUCATION/TRAINING PROGRAM

## 2025-08-29 PROCEDURE — 99215 OFFICE O/P EST HI 40 MIN: CPT | Performed by: STUDENT IN AN ORGANIZED HEALTH CARE EDUCATION/TRAINING PROGRAM

## 2025-08-29 PROCEDURE — 99205 OFFICE O/P NEW HI 60 MIN: CPT | Performed by: STUDENT IN AN ORGANIZED HEALTH CARE EDUCATION/TRAINING PROGRAM

## 2025-08-29 ASSESSMENT — COLUMBIA-SUICIDE SEVERITY RATING SCALE - C-SSRS
6. HAVE YOU EVER DONE ANYTHING, STARTED TO DO ANYTHING, OR PREPARED TO DO ANYTHING TO END YOUR LIFE?: NO
1. IN THE PAST MONTH, HAVE YOU WISHED YOU WERE DEAD OR WISHED YOU COULD GO TO SLEEP AND NOT WAKE UP?: NO
2. HAVE YOU ACTUALLY HAD ANY THOUGHTS OF KILLING YOURSELF?: NO

## 2025-08-29 ASSESSMENT — ENCOUNTER SYMPTOMS
DEPRESSION: 0
LOSS OF SENSATION IN FEET: 0
OCCASIONAL FEELINGS OF UNSTEADINESS: 0

## 2025-08-29 ASSESSMENT — PAIN SCALES - GENERAL: PAINLEVEL_OUTOF10: 0-NO PAIN

## 2025-09-03 ENCOUNTER — HOSPITAL ENCOUNTER (OUTPATIENT)
Dept: RADIOLOGY | Facility: HOSPITAL | Age: 65
Discharge: HOME | End: 2025-09-03
Payer: COMMERCIAL

## 2025-09-03 DIAGNOSIS — C32.9 MALIGNANT NEOPLASM OF LARYNX: ICD-10-CM

## 2025-09-03 PROCEDURE — 2550000001 HC RX 255 CONTRASTS: Performed by: STUDENT IN AN ORGANIZED HEALTH CARE EDUCATION/TRAINING PROGRAM

## 2025-09-03 PROCEDURE — 77334 RADIATION TREATMENT AID(S): CPT | Performed by: STUDENT IN AN ORGANIZED HEALTH CARE EDUCATION/TRAINING PROGRAM

## 2025-09-03 RX ADMIN — IOHEXOL 75 ML: 350 INJECTION, SOLUTION INTRAVENOUS at 13:11

## (undated) DEVICE — BOWL, BASIN, 32 OZ, STERILE

## (undated) DEVICE — TUBING, SUCTION, CONNECTING, STERILE 0.25 X 120 IN., LF

## (undated) DEVICE — VALVE, BIOPSY, BRONCHOSCOPE, DISPOSABLE, STERILE

## (undated) DEVICE — Device

## (undated) DEVICE — MANIFOLD, 4 PORT NEPTUNE STANDARD

## (undated) DEVICE — DRESSING, GAUZE, 16 PLY, 4 X 4 IN, STERILE

## (undated) DEVICE — ADAPTER, WATER BOTTLE, SMART CAP, 140/160/180 SERIES

## (undated) DEVICE — COVER, CART, 45 X 27 X 48 IN, CLEAR

## (undated) DEVICE — REST, HEAD, BAGEL, 9 IN

## (undated) DEVICE — VALVE, SUCTION

## (undated) DEVICE — DRESSING, NON-ADHERENT, TELFA, OUCHLESS, 3 X 8 IN, STERILE

## (undated) DEVICE — VALVE, DEFENDO, 5 PCS BUTTON SET, SINGLE USE